# Patient Record
Sex: MALE | Race: OTHER | Employment: UNEMPLOYED | URBAN - METROPOLITAN AREA
[De-identification: names, ages, dates, MRNs, and addresses within clinical notes are randomized per-mention and may not be internally consistent; named-entity substitution may affect disease eponyms.]

---

## 2017-05-16 ENCOUNTER — HOSPITAL ENCOUNTER (OUTPATIENT)
Dept: MRI IMAGING | Age: 62
Discharge: HOME | End: 2017-05-16

## 2017-05-16 ENCOUNTER — HOSPITAL ENCOUNTER (OUTPATIENT)
Dept: GENERAL RADIOLOGY | Age: 62
Discharge: HOME | End: 2017-05-16

## 2017-05-16 DIAGNOSIS — I69.30 SEQUELAE OF CEREBRAL INFARCTION: ICD-10-CM

## 2017-05-16 DIAGNOSIS — Z13.89 ENCOUNTER FOR IMAGING TO SCREEN FOR METAL PRIOR TO MRI: ICD-10-CM

## 2017-07-11 ENCOUNTER — OFFICE VISIT (OUTPATIENT)
Dept: ORTHOPEDIC SURGERY | Age: 62
End: 2017-07-11

## 2017-07-11 VITALS — BODY MASS INDEX: 34.53 KG/M2 | WEIGHT: 220.02 LBS | HEIGHT: 67 IN

## 2017-07-11 DIAGNOSIS — M25.571 ACUTE RIGHT ANKLE PAIN: Primary | ICD-10-CM

## 2017-07-11 PROCEDURE — L4360 PNEUMAT WALKING BOOT PRE CST: HCPCS | Performed by: ORTHOPAEDIC SURGERY

## 2017-07-11 PROCEDURE — 99203 OFFICE O/P NEW LOW 30 MIN: CPT | Performed by: ORTHOPAEDIC SURGERY

## 2017-08-15 ENCOUNTER — OFFICE VISIT (OUTPATIENT)
Dept: ORTHOPEDIC SURGERY | Age: 62
End: 2017-08-15

## 2017-08-15 VITALS
DIASTOLIC BLOOD PRESSURE: 104 MMHG | SYSTOLIC BLOOD PRESSURE: 163 MMHG | BODY MASS INDEX: 34.53 KG/M2 | HEIGHT: 67 IN | WEIGHT: 220.02 LBS | HEART RATE: 58 BPM

## 2017-08-15 DIAGNOSIS — M25.571 ACUTE RIGHT ANKLE PAIN: Primary | ICD-10-CM

## 2017-08-15 DIAGNOSIS — S82.811A CLOSED TORUS FRACTURE OF PROXIMAL END OF RIGHT FIBULA, INITIAL ENCOUNTER: ICD-10-CM

## 2017-08-15 PROCEDURE — 73590 X-RAY EXAM OF LOWER LEG: CPT | Performed by: ORTHOPAEDIC SURGERY

## 2017-08-15 PROCEDURE — L1902 AFO ANKLE GAUNTLET PRE OTS: HCPCS | Performed by: ORTHOPAEDIC SURGERY

## 2017-08-15 PROCEDURE — 27780 TREATMENT OF FIBULA FRACTURE: CPT | Performed by: ORTHOPAEDIC SURGERY

## 2017-08-15 PROCEDURE — 99212 OFFICE O/P EST SF 10 MIN: CPT | Performed by: ORTHOPAEDIC SURGERY

## 2017-11-02 ENCOUNTER — HOSPITAL ENCOUNTER (OUTPATIENT)
Dept: CT IMAGING | Age: 62
Discharge: HOME | End: 2017-11-02
Attending: NURSE PRACTITIONER | Admitting: NURSE PRACTITIONER

## 2018-04-13 PROBLEM — I50.41 ACUTE COMBINED SYSTOLIC AND DIASTOLIC CHF, NYHA CLASS 3 (HCC): Status: ACTIVE | Noted: 2018-04-13

## 2018-04-21 ENCOUNTER — TELEPHONE (OUTPATIENT)
Dept: PULMONOLOGY | Age: 63
End: 2018-04-21

## 2018-04-23 ENCOUNTER — TELEPHONE (OUTPATIENT)
Dept: MEDSURG UNIT | Age: 63
End: 2018-04-23

## 2018-04-24 ENCOUNTER — OFFICE VISIT (OUTPATIENT)
Dept: INTERNAL MEDICINE | Age: 63
End: 2018-04-24

## 2018-04-24 VITALS
HEART RATE: 58 BPM | DIASTOLIC BLOOD PRESSURE: 94 MMHG | SYSTOLIC BLOOD PRESSURE: 175 MMHG | WEIGHT: 208 LBS | BODY MASS INDEX: 33.57 KG/M2 | OXYGEN SATURATION: 96 %

## 2018-04-24 DIAGNOSIS — I63.9 CEREBROVASCULAR ACCIDENT (CVA), UNSPECIFIED MECHANISM (HCC): ICD-10-CM

## 2018-04-24 DIAGNOSIS — J45.40 MODERATE PERSISTENT ASTHMA, UNSPECIFIED WHETHER COMPLICATED: ICD-10-CM

## 2018-04-24 DIAGNOSIS — J81.0 ACUTE PULMONARY EDEMA (HCC): Primary | ICD-10-CM

## 2018-04-24 DIAGNOSIS — B19.20 HEPATITIS C VIRUS INFECTION WITHOUT HEPATIC COMA, UNSPECIFIED CHRONICITY: ICD-10-CM

## 2018-04-24 PROCEDURE — 99999 PR OFFICE/OUTPT VISIT,PROCEDURE ONLY: CPT | Performed by: INTERNAL MEDICINE

## 2018-04-24 RX ORDER — ASPIRIN 81 MG/1
81 TABLET ORAL DAILY
Qty: 30 TABLET | Refills: 3 | Status: SHIPPED | OUTPATIENT
Start: 2018-04-24 | End: 2018-09-04 | Stop reason: SDUPTHER

## 2018-04-24 RX ORDER — AMLODIPINE BESYLATE 10 MG/1
10 TABLET ORAL DAILY
Qty: 30 TABLET | Refills: 3 | Status: SHIPPED | OUTPATIENT
Start: 2018-04-24 | End: 2018-08-30 | Stop reason: SDUPTHER

## 2018-04-24 RX ORDER — SPIRONOLACTONE 25 MG/1
25 TABLET ORAL DAILY
Qty: 30 TABLET | Refills: 3 | Status: SHIPPED | OUTPATIENT
Start: 2018-04-24 | End: 2018-09-04 | Stop reason: SDUPTHER

## 2018-04-24 RX ORDER — VALSARTAN 160 MG/1
160 TABLET ORAL DAILY
Qty: 30 TABLET | Refills: 3 | Status: SHIPPED | OUTPATIENT
Start: 2018-04-24 | End: 2020-01-24

## 2018-04-24 RX ORDER — CLOPIDOGREL BISULFATE 75 MG/1
75 TABLET ORAL DAILY
Qty: 30 TABLET | Refills: 3 | Status: SHIPPED | OUTPATIENT
Start: 2018-04-24 | End: 2018-09-04 | Stop reason: SDUPTHER

## 2018-04-24 RX ORDER — ATORVASTATIN CALCIUM 40 MG/1
40 TABLET, FILM COATED ORAL NIGHTLY
Qty: 30 TABLET | Refills: 3 | Status: SHIPPED | OUTPATIENT
Start: 2018-04-24 | End: 2018-09-04 | Stop reason: SDUPTHER

## 2018-04-24 RX ORDER — BUDESONIDE AND FORMOTEROL FUMARATE DIHYDRATE 80; 4.5 UG/1; UG/1
2 AEROSOL RESPIRATORY (INHALATION) 2 TIMES DAILY
Qty: 1 INHALER | Refills: 3 | Status: SHIPPED | OUTPATIENT
Start: 2018-04-24 | End: 2018-04-30 | Stop reason: CLARIF

## 2018-05-09 ENCOUNTER — TELEPHONE (OUTPATIENT)
Dept: OTHER | Age: 63
End: 2018-05-09

## 2018-05-09 ENCOUNTER — OFFICE VISIT (OUTPATIENT)
Dept: PULMONOLOGY | Age: 63
End: 2018-05-09

## 2018-05-09 ENCOUNTER — HOSPITAL ENCOUNTER (OUTPATIENT)
Dept: OTHER | Age: 63
Discharge: HOME | End: 2018-05-09
Attending: INTERNAL MEDICINE | Admitting: INTERNAL MEDICINE

## 2018-05-09 ENCOUNTER — OFFICE VISIT (OUTPATIENT)
Dept: OTHER | Age: 63
End: 2018-05-09

## 2018-05-09 VITALS
BODY MASS INDEX: 33.59 KG/M2 | OXYGEN SATURATION: 97 % | WEIGHT: 214 LBS | HEART RATE: 63 BPM | SYSTOLIC BLOOD PRESSURE: 130 MMHG | DIASTOLIC BLOOD PRESSURE: 78 MMHG | HEIGHT: 67 IN

## 2018-05-09 VITALS
HEIGHT: 67 IN | RESPIRATION RATE: 16 BRPM | OXYGEN SATURATION: 96 % | TEMPERATURE: 97.6 F | HEART RATE: 62 BPM | DIASTOLIC BLOOD PRESSURE: 79 MMHG | BODY MASS INDEX: 33.12 KG/M2 | SYSTOLIC BLOOD PRESSURE: 133 MMHG | WEIGHT: 211 LBS

## 2018-05-09 DIAGNOSIS — J81.0 ACUTE PULMONARY EDEMA (HCC): ICD-10-CM

## 2018-05-09 DIAGNOSIS — E78.2 MIXED HYPERLIPIDEMIA: ICD-10-CM

## 2018-05-09 DIAGNOSIS — R55 SYNCOPE AND COLLAPSE: ICD-10-CM

## 2018-05-09 DIAGNOSIS — J94.2 HEMOTHORAX ON RIGHT: Primary | ICD-10-CM

## 2018-05-09 DIAGNOSIS — I10 ESSENTIAL HYPERTENSION: Primary | ICD-10-CM

## 2018-05-09 DIAGNOSIS — R06.02 SOB (SHORTNESS OF BREATH): ICD-10-CM

## 2018-05-09 DIAGNOSIS — F10.20 UNCOMPLICATED ALCOHOL DEPENDENCE (HCC): ICD-10-CM

## 2018-05-09 DIAGNOSIS — I10 ESSENTIAL HYPERTENSION: ICD-10-CM

## 2018-05-09 DIAGNOSIS — E66.09 CLASS 1 OBESITY DUE TO EXCESS CALORIES WITHOUT SERIOUS COMORBIDITY WITH BODY MASS INDEX (BMI) OF 33.0 TO 33.9 IN ADULT: ICD-10-CM

## 2018-05-09 DIAGNOSIS — B18.2 CHRONIC HEPATITIS C WITHOUT HEPATIC COMA (HCC): ICD-10-CM

## 2018-05-09 DIAGNOSIS — B19.20 HEPATITIS C VIRUS INFECTION WITHOUT HEPATIC COMA, UNSPECIFIED CHRONICITY: ICD-10-CM

## 2018-05-09 DIAGNOSIS — S22.31XD CLOSED FRACTURE OF ONE RIB OF RIGHT SIDE WITH ROUTINE HEALING, SUBSEQUENT ENCOUNTER: ICD-10-CM

## 2018-05-09 DIAGNOSIS — Z86.73 HISTORY OF STROKE: ICD-10-CM

## 2018-05-09 DIAGNOSIS — D69.6 THROMBOCYTOPENIA (HCC): ICD-10-CM

## 2018-05-09 LAB
A/G RATIO: 1.4 (ref 1.1–2.2)
ALBUMIN SERPL-MCNC: 4.5 G/DL (ref 3.4–5)
ALP BLD-CCNC: 98 U/L (ref 40–129)
ALT SERPL-CCNC: 46 U/L (ref 10–40)
ANION GAP SERPL CALCULATED.3IONS-SCNC: 16 MMOL/L (ref 3–16)
AST SERPL-CCNC: 46 U/L (ref 15–37)
BILIRUB SERPL-MCNC: 0.7 MG/DL (ref 0–1)
BUN BLDV-MCNC: 18 MG/DL (ref 7–20)
CALCIUM SERPL-MCNC: 9.4 MG/DL (ref 8.3–10.6)
CHLORIDE BLD-SCNC: 101 MMOL/L (ref 99–110)
CO2: 21 MMOL/L (ref 21–32)
CREAT SERPL-MCNC: 0.7 MG/DL (ref 0.8–1.3)
GFR AFRICAN AMERICAN: >60
GFR NON-AFRICAN AMERICAN: >60
GLOBULIN: 3.3 G/DL
GLUCOSE BLD-MCNC: 105 MG/DL (ref 70–99)
POTASSIUM SERPL-SCNC: 4.6 MMOL/L (ref 3.5–5.1)
SODIUM BLD-SCNC: 138 MMOL/L (ref 136–145)
TOTAL PROTEIN: 7.8 G/DL (ref 6.4–8.2)

## 2018-05-09 PROCEDURE — G8427 DOCREV CUR MEDS BY ELIG CLIN: HCPCS | Performed by: INTERNAL MEDICINE

## 2018-05-09 PROCEDURE — 1111F DSCHRG MED/CURRENT MED MERGE: CPT | Performed by: INTERNAL MEDICINE

## 2018-05-09 PROCEDURE — 99213 OFFICE O/P EST LOW 20 MIN: CPT | Performed by: INTERNAL MEDICINE

## 2018-05-09 PROCEDURE — G8417 CALC BMI ABV UP PARAM F/U: HCPCS | Performed by: INTERNAL MEDICINE

## 2018-05-09 PROCEDURE — 3017F COLORECTAL CA SCREEN DOC REV: CPT | Performed by: INTERNAL MEDICINE

## 2018-05-09 PROCEDURE — G8598 ASA/ANTIPLAT THER USED: HCPCS | Performed by: INTERNAL MEDICINE

## 2018-05-09 PROCEDURE — 1036F TOBACCO NON-USER: CPT | Performed by: INTERNAL MEDICINE

## 2018-05-09 RX ORDER — ALBUTEROL SULFATE 90 UG/1
2 AEROSOL, METERED RESPIRATORY (INHALATION) EVERY 6 HOURS PRN
COMMUNITY
End: 2019-11-06 | Stop reason: SDUPTHER

## 2018-05-09 ASSESSMENT — ENCOUNTER SYMPTOMS
COUGH: 0
SHORTNESS OF BREATH: 0

## 2018-05-11 ENCOUNTER — TELEPHONE (OUTPATIENT)
Dept: PULMONOLOGY | Age: 63
End: 2018-05-11

## 2018-05-11 DIAGNOSIS — J90 PLEURAL EFFUSION: Primary | ICD-10-CM

## 2018-05-11 ASSESSMENT — ENCOUNTER SYMPTOMS
SHORTNESS OF BREATH: 1
TROUBLE SWALLOWING: 1

## 2018-05-15 LAB
EER HCV RNA QNT PCR: NORMAL
HCV RNA QNT REAL-TIME PCR INTERP: NOT DETECTED
HCV RNA, QUANTITATIVE REAL TIME PCR: <1.2 LOG IU
HEPATITIS C RNA PCR QUANT: <15 IU/ML

## 2018-06-19 ENCOUNTER — HOSPITAL ENCOUNTER (OUTPATIENT)
Dept: OTHER | Age: 63
Discharge: HOME | End: 2018-06-19
Attending: FAMILY MEDICINE | Admitting: FAMILY MEDICINE

## 2018-06-19 DIAGNOSIS — M25.521 ARTHRALGIA OF ELBOW, RIGHT: ICD-10-CM

## 2018-08-01 ENCOUNTER — TELEPHONE (OUTPATIENT)
Dept: OTHER | Age: 63
End: 2018-08-01

## 2018-08-01 RX ORDER — LOSARTAN POTASSIUM 100 MG/1
100 TABLET ORAL DAILY
Qty: 30 TABLET | Refills: 3 | Status: SHIPPED | OUTPATIENT
Start: 2018-08-01 | End: 2018-11-08 | Stop reason: SDUPTHER

## 2018-08-01 NOTE — TELEPHONE ENCOUNTER
Pharmacy called and they are unable to get the Valsartan due to the recall. He was taking 160 mg qd. Please prescribe a new ARB for him. The pharmacy is correct in [facility]

## 2018-08-15 ENCOUNTER — HOSPITAL ENCOUNTER (OUTPATIENT)
Dept: GENERAL RADIOLOGY | Age: 63
Discharge: HOME | End: 2018-08-15
Payer: MEDICAID

## 2018-08-15 ENCOUNTER — HOSPITAL ENCOUNTER (OUTPATIENT)
Age: 63
Discharge: HOME | End: 2018-08-15
Payer: MEDICAID

## 2018-08-15 ENCOUNTER — OFFICE VISIT (OUTPATIENT)
Dept: PULMONOLOGY | Age: 63
End: 2018-08-15

## 2018-08-15 VITALS — OXYGEN SATURATION: 98 % | WEIGHT: 208 LBS | RESPIRATION RATE: 16 BRPM | BODY MASS INDEX: 32.65 KG/M2 | HEIGHT: 67 IN

## 2018-08-15 DIAGNOSIS — E66.09 CLASS 1 OBESITY DUE TO EXCESS CALORIES WITHOUT SERIOUS COMORBIDITY WITH BODY MASS INDEX (BMI) OF 33.0 TO 33.9 IN ADULT: ICD-10-CM

## 2018-08-15 DIAGNOSIS — S22.31XD CLOSED FRACTURE OF ONE RIB OF RIGHT SIDE WITH ROUTINE HEALING, SUBSEQUENT ENCOUNTER: ICD-10-CM

## 2018-08-15 DIAGNOSIS — J94.2 HEMOTHORAX ON RIGHT: ICD-10-CM

## 2018-08-15 DIAGNOSIS — J90 PLEURAL EFFUSION: Primary | ICD-10-CM

## 2018-08-15 DIAGNOSIS — J90 PLEURAL EFFUSION: ICD-10-CM

## 2018-08-15 DIAGNOSIS — R06.02 SOB (SHORTNESS OF BREATH): ICD-10-CM

## 2018-08-15 PROCEDURE — G8417 CALC BMI ABV UP PARAM F/U: HCPCS | Performed by: INTERNAL MEDICINE

## 2018-08-15 PROCEDURE — G8598 ASA/ANTIPLAT THER USED: HCPCS | Performed by: INTERNAL MEDICINE

## 2018-08-15 PROCEDURE — 3017F COLORECTAL CA SCREEN DOC REV: CPT | Performed by: INTERNAL MEDICINE

## 2018-08-15 PROCEDURE — G8427 DOCREV CUR MEDS BY ELIG CLIN: HCPCS | Performed by: INTERNAL MEDICINE

## 2018-08-15 PROCEDURE — 1036F TOBACCO NON-USER: CPT | Performed by: INTERNAL MEDICINE

## 2018-08-15 PROCEDURE — 99213 OFFICE O/P EST LOW 20 MIN: CPT | Performed by: INTERNAL MEDICINE

## 2018-08-15 PROCEDURE — 71046 X-RAY EXAM CHEST 2 VIEWS: CPT

## 2018-08-15 ASSESSMENT — ENCOUNTER SYMPTOMS: SHORTNESS OF BREATH: 1

## 2018-08-15 NOTE — PROGRESS NOTES
protocol,      He has past history of hepatitis C, hemorrhagic stroke without deficit.      He was admitted for CHF, diuresed. Plavix and statin was continued. He has improved but has persistent chest discomfort and persistent pleural effusion.        Past Medical History:   Diagnosis Date    Aneurysm ([facility]     cerebral aneurysm - stated    Asthma     Cerebral artery occlusion with cerebral infarction ([facility] [address] [facility]     Hepatitis C     Hypertension        Past Surgical History:   Procedure Laterality Date    COLONOSCOPY  2016    polyps - per patient       Social History   Substance Use Topics    Smoking status: Never Smoker    Smokeless tobacco: Never Used    Alcohol use 44.4 oz/week     74 Cans of beer per week      Comment: 12 pk a night       Family History   Problem Relation Age of Onset    Other Mother         aneurysm    Diabetes Mother     High Blood Pressure Mother     High Cholesterol Mother     Heart Disease Father     Diabetes Father     High Blood Pressure Father     High Cholesterol Father          Current Outpatient Prescriptions:     losartan (COZAAR) 100 MG tablet, Take 1 tablet by mouth daily, Disp: 30 tablet, Rfl: 3    atorvastatin (LIPITOR) 40 MG tablet, Take 1 tablet by mouth nightly, Disp: 30 tablet, Rfl: 3    spironolactone (ALDACTONE) 25 MG tablet, Take 1 tablet by mouth daily, Disp: 30 tablet, Rfl: 3    aspirin EC 81 MG EC tablet, Take 1 tablet by mouth daily, Disp: 30 tablet, Rfl: 3    amLODIPine (NORVASC) 10 MG tablet, Take 1 tablet by mouth daily, Disp: 30 tablet, Rfl: 3    Multiple Vitamins-Minerals (THERAPEUTIC MULTIVITAMIN-MINERALS) tablet, Take 1 tablet by mouth daily, Disp: 30 tablet, Rfl: 1    albuterol sulfate HFA (PROAIR HFA) 108 (90 Base) MCG/ACT inhaler, Inhale 2 puffs into the lungs every 6 hours as needed for Wheezing, Disp: , Rfl:     Fluticasone Furoate-Vilanterol (BREO ELLIPTA IN), Inhale into the lungs 2 times daily Inhale two puffs twice daily, Disp: , Rfl:

## 2018-08-15 NOTE — LETTER
[address] [place] [address] Pulmonary Critical Care and Sleep  [address] [place] [address] Margaret Goss [address]  Phone: [phone]  Fax: [phone]    James Austin MD        August 18, 2018       Patient: Vince Smart   MR Number: [id number]   YOB: 1955   Date of Visit: 8/15/2018       Dear Dr. Christine Brewster:    Jeff Guthrie was seen in follow-up today. Below are the relevant portions of my assessment and plan of care. If you have questions, please do not hesitate to call me. I look forward to following Genoveva Gardner along with you.     Sincerely,        Rochelle Braxton MD    CC providers:  Marylen Putt, MD  [address] [place] [address]

## 2018-08-21 ENCOUNTER — APPOINTMENT (OUTPATIENT)
Dept: CT IMAGING | Age: 63
DRG: 045 | End: 2018-08-21
Payer: MEDICAID

## 2018-08-21 ENCOUNTER — APPOINTMENT (OUTPATIENT)
Dept: GENERAL RADIOLOGY | Age: 63
DRG: 045 | End: 2018-08-21
Payer: MEDICAID

## 2018-08-21 ENCOUNTER — HOSPITAL ENCOUNTER (INPATIENT)
Age: 63
LOS: 2 days | Discharge: HOME HEALTH | DRG: 045 | End: 2018-08-23
Attending: EMERGENCY MEDICINE | Admitting: INTERNAL MEDICINE
Payer: MEDICAID

## 2018-08-21 DIAGNOSIS — R20.2 NUMBNESS AND TINGLING: Primary | ICD-10-CM

## 2018-08-21 DIAGNOSIS — R20.0 NUMBNESS AND TINGLING: Primary | ICD-10-CM

## 2018-08-21 PROBLEM — J45.909 ASTHMA: Status: ACTIVE | Noted: 2018-08-21

## 2018-08-21 LAB
A/G RATIO: 1.5 (ref 1.1–2.2)
ALBUMIN SERPL-MCNC: 4.7 G/DL (ref 3.4–5)
ALP BLD-CCNC: 92 U/L (ref 40–129)
ALT SERPL-CCNC: 42 U/L (ref 10–40)
ANION GAP SERPL CALCULATED.3IONS-SCNC: 15 MMOL/L (ref 3–16)
AST SERPL-CCNC: 29 U/L (ref 15–37)
BASOPHILS ABSOLUTE: 0.1 K/UL (ref 0–0.2)
BASOPHILS RELATIVE PERCENT: 1.1 %
BILIRUB SERPL-MCNC: 0.9 MG/DL (ref 0–1)
BUN BLDV-MCNC: 19 MG/DL (ref 7–20)
CALCIUM SERPL-MCNC: 9.4 MG/DL (ref 8.3–10.6)
CHLORIDE BLD-SCNC: 101 MMOL/L (ref 99–110)
CO2: 22 MMOL/L (ref 21–32)
CREAT SERPL-MCNC: 1.4 MG/DL (ref 0.8–1.3)
EOSINOPHILS ABSOLUTE: 0.1 K/UL (ref 0–0.6)
EOSINOPHILS RELATIVE PERCENT: 1 %
GFR AFRICAN AMERICAN: >60
GFR NON-AFRICAN AMERICAN: 51
GLOBULIN: 3.2 G/DL
GLUCOSE BLD-MCNC: 103 MG/DL (ref 70–99)
GLUCOSE BLD-MCNC: 112 MG/DL (ref 70–99)
HCT VFR BLD CALC: 48 % (ref 40.5–52.5)
HEMOGLOBIN: 16.7 G/DL (ref 13.5–17.5)
LYMPHOCYTES ABSOLUTE: 0.6 K/UL (ref 1–5.1)
LYMPHOCYTES RELATIVE PERCENT: 9.1 %
MCH RBC QN AUTO: 30.3 PG (ref 26–34)
MCHC RBC AUTO-ENTMCNC: 34.8 G/DL (ref 31–36)
MCV RBC AUTO: 86.9 FL (ref 80–100)
MONOCYTES ABSOLUTE: 0.8 K/UL (ref 0–1.3)
MONOCYTES RELATIVE PERCENT: 11.8 %
NEUTROPHILS ABSOLUTE: 5.1 K/UL (ref 1.7–7.7)
NEUTROPHILS RELATIVE PERCENT: 77 %
PDW BLD-RTO: 14.5 % (ref 12.4–15.4)
PERFORMED ON: ABNORMAL
PLATELET # BLD: 169 K/UL (ref 135–450)
PMV BLD AUTO: 9 FL (ref 5–10.5)
POTASSIUM SERPL-SCNC: 4.2 MMOL/L (ref 3.5–5.1)
RBC # BLD: 5.52 M/UL (ref 4.2–5.9)
SODIUM BLD-SCNC: 138 MMOL/L (ref 136–145)
TOTAL PROTEIN: 7.9 G/DL (ref 6.4–8.2)
TROPONIN: <0.01 NG/ML
WBC # BLD: 6.6 K/UL (ref 4–11)

## 2018-08-21 PROCEDURE — 1200000000 HC SEMI PRIVATE

## 2018-08-21 PROCEDURE — 6370000000 HC RX 637 (ALT 250 FOR IP): Performed by: INTERNAL MEDICINE

## 2018-08-21 PROCEDURE — 80053 COMPREHEN METABOLIC PANEL: CPT

## 2018-08-21 PROCEDURE — 2580000003 HC RX 258: Performed by: INTERNAL MEDICINE

## 2018-08-21 PROCEDURE — 96360 HYDRATION IV INFUSION INIT: CPT

## 2018-08-21 PROCEDURE — 84484 ASSAY OF TROPONIN QUANT: CPT

## 2018-08-21 PROCEDURE — 2580000003 HC RX 258: Performed by: EMERGENCY MEDICINE

## 2018-08-21 PROCEDURE — 36415 COLL VENOUS BLD VENIPUNCTURE: CPT

## 2018-08-21 PROCEDURE — 85025 COMPLETE CBC W/AUTO DIFF WBC: CPT

## 2018-08-21 PROCEDURE — 70450 CT HEAD/BRAIN W/O DYE: CPT

## 2018-08-21 PROCEDURE — 93005 ELECTROCARDIOGRAM TRACING: CPT | Performed by: EMERGENCY MEDICINE

## 2018-08-21 PROCEDURE — 99285 EMERGENCY DEPT VISIT HI MDM: CPT

## 2018-08-21 PROCEDURE — 71046 X-RAY EXAM CHEST 2 VIEWS: CPT

## 2018-08-21 RX ORDER — ASPIRIN 81 MG/1
81 TABLET ORAL DAILY
Status: DISCONTINUED | OUTPATIENT
Start: 2018-08-21 | End: 2018-08-23 | Stop reason: HOSPADM

## 2018-08-21 RX ORDER — ATORVASTATIN CALCIUM 40 MG/1
40 TABLET, FILM COATED ORAL NIGHTLY
Status: DISCONTINUED | OUTPATIENT
Start: 2018-08-21 | End: 2018-08-23 | Stop reason: HOSPADM

## 2018-08-21 RX ORDER — AMLODIPINE BESYLATE 5 MG/1
10 TABLET ORAL DAILY
Status: DISCONTINUED | OUTPATIENT
Start: 2018-08-21 | End: 2018-08-23 | Stop reason: HOSPADM

## 2018-08-21 RX ORDER — FLUTICASONE FUROATE AND VILANTEROL 200; 25 UG/1; UG/1
1 POWDER RESPIRATORY (INHALATION) 2 TIMES DAILY
Status: DISCONTINUED | OUTPATIENT
Start: 2018-08-21 | End: 2018-08-23 | Stop reason: HOSPADM

## 2018-08-21 RX ORDER — SPIRONOLACTONE 25 MG/1
25 TABLET ORAL DAILY
Status: DISCONTINUED | OUTPATIENT
Start: 2018-08-21 | End: 2018-08-21

## 2018-08-21 RX ORDER — ONDANSETRON 2 MG/ML
4 INJECTION INTRAMUSCULAR; INTRAVENOUS EVERY 6 HOURS PRN
Status: DISCONTINUED | OUTPATIENT
Start: 2018-08-21 | End: 2018-08-23 | Stop reason: HOSPADM

## 2018-08-21 RX ORDER — THIAMINE MONONITRATE (VIT B1) 100 MG
100 TABLET ORAL DAILY
Status: DISCONTINUED | OUTPATIENT
Start: 2018-08-21 | End: 2018-08-23 | Stop reason: HOSPADM

## 2018-08-21 RX ORDER — TRAZODONE HYDROCHLORIDE 50 MG/1
50 TABLET ORAL NIGHTLY
Status: DISCONTINUED | OUTPATIENT
Start: 2018-08-21 | End: 2018-08-23 | Stop reason: HOSPADM

## 2018-08-21 RX ORDER — CLOPIDOGREL BISULFATE 75 MG/1
75 TABLET ORAL DAILY
Status: DISCONTINUED | OUTPATIENT
Start: 2018-08-21 | End: 2018-08-23 | Stop reason: HOSPADM

## 2018-08-21 RX ORDER — SODIUM CHLORIDE 0.9 % (FLUSH) 0.9 %
10 SYRINGE (ML) INJECTION EVERY 12 HOURS SCHEDULED
Status: DISCONTINUED | OUTPATIENT
Start: 2018-08-21 | End: 2018-08-23 | Stop reason: HOSPADM

## 2018-08-21 RX ORDER — SODIUM CHLORIDE 0.9 % (FLUSH) 0.9 %
10 SYRINGE (ML) INJECTION PRN
Status: DISCONTINUED | OUTPATIENT
Start: 2018-08-21 | End: 2018-08-23 | Stop reason: HOSPADM

## 2018-08-21 RX ORDER — LOSARTAN POTASSIUM 100 MG/1
100 TABLET ORAL DAILY
Status: DISCONTINUED | OUTPATIENT
Start: 2018-08-21 | End: 2018-08-23 | Stop reason: HOSPADM

## 2018-08-21 RX ORDER — ALBUTEROL SULFATE 90 UG/1
2 AEROSOL, METERED RESPIRATORY (INHALATION) EVERY 6 HOURS PRN
Status: DISCONTINUED | OUTPATIENT
Start: 2018-08-21 | End: 2018-08-23 | Stop reason: HOSPADM

## 2018-08-21 RX ORDER — TOPIRAMATE 25 MG/1
50 TABLET ORAL 2 TIMES DAILY
Status: DISCONTINUED | OUTPATIENT
Start: 2018-08-21 | End: 2018-08-21

## 2018-08-21 RX ORDER — 0.9 % SODIUM CHLORIDE 0.9 %
1000 INTRAVENOUS SOLUTION INTRAVENOUS ONCE
Status: COMPLETED | OUTPATIENT
Start: 2018-08-21 | End: 2018-08-21

## 2018-08-21 RX ADMIN — CLOPIDOGREL BISULFATE 75 MG: 75 TABLET ORAL at 21:23

## 2018-08-21 RX ADMIN — SODIUM CHLORIDE 1000 ML: 9 INJECTION, SOLUTION INTRAVENOUS at 15:03

## 2018-08-21 RX ADMIN — TRAZODONE HYDROCHLORIDE 50 MG: 50 TABLET ORAL at 21:23

## 2018-08-21 RX ADMIN — Medication 10 ML: at 21:24

## 2018-08-21 NOTE — PROGRESS NOTES
Patient admitted to room 210-2 from ED. Patient oriented to room, call light, bed rails, phone, lights and bathroom. Patient instructed about the schedule of the day including: vital sign frequency, lab draws, possible tests, frequency of MD and staff rounds, including RN/MD rounding together at bedside, daily weights, and I &O's. Patient instructed about prescribed diet, how to use 8MENU, and television. Bed alarm in place, patient aware of placement and reason. Telemetry box 55 in place, patient aware of placement and reason. Bed locked, in lowest position, side rails up 2/4, call light within reach. Will continue to monitor.

## 2018-08-21 NOTE — H&P
Hospital Medicine History & Physical      PCP: Marta Shelby MD    Date of Admission: 8/21/2018    Date of Service: Pt seen/examined on 8/21/2018 and Admitted to Inpatient with expected LOS greater than two midnights due to medical therapy. Chief Complaint:  Numbness and tingling in the right side including face. History Of Present Illness:      61 y.o. male who presented to [facility] with above complaint. He denies change in mental status, slurred speech, swallowing difficulty, palpitation, dizziness, syncope, chest pain, shortness of breath, abdominal pain or lower extremity edema. Past Medical History:          Diagnosis Date    Aneurysm ([facility] [address]     cerebral aneurysm - stated    Asthma     Cerebral artery occlusion with cerebral infarction ([facility] [address] [facility]     Hepatitis C     Hypertension        Past Surgical History:          Procedure Laterality Date    COLONOSCOPY  2016    polyps - per patient       Medications Prior to Admission:      Prior to Admission medications    Medication Sig Start Date End Date Taking?  Authorizing Provider   losartan (COZAAR) 100 MG tablet Take 1 tablet by mouth daily 8/1/18  Yes Nova Livingston MD   albuterol sulfate HFA (PROAIR HFA) 108 (90 Base) MCG/ACT inhaler Inhale 2 puffs into the lungs every 6 hours as needed for Wheezing   Yes Historical Provider, MD   Fluticasone Furoate-Vilanterol (BREO ELLIPTA IN) Inhale into the lungs 2 times daily Inhale two puffs twice daily   Yes Historical Provider, MD   atorvastatin (LIPITOR) 40 MG tablet Take 1 tablet by mouth nightly 4/24/18  Yes Nova Livingston MD   valsartan (DIOVAN) 160 MG tablet Take 1 tablet by mouth daily 4/24/18  Yes Nova Livingston MD   spironolactone (ALDACTONE) 25 MG tablet Take 1 tablet by mouth daily 4/24/18  Yes Nova Livingston MD   aspirin EC 81 MG EC tablet Take 1 tablet by mouth daily 4/24/18  Yes Nova Livingston MD   clopidogrel (PLAVIX) 75 MG tablet Take 1 tablet by pleural effusion         CT HEAD WO CONTRAST   Final Result   No acute intracranial abnormality. Critical results were called by Dr. Beth Aguillon MD to Martha Bledsoe   on 8/21/2018 at 12:37. ASSESSMENT:    Active Hospital Problems    Diagnosis Date Noted    Cerebrovascular accident (CVA) determined by clinical assessment ([facility] [I63.9] 08/21/2018    Asthma [J45.909] 08/21/2018    HLD (hyperlipidemia) [E78.5] 08/20/2016    HTN (hypertension) [I10] 08/20/2016    Chronic hepatitis C virus infection ([facility] [B18.2] 08/20/2016    Remote history of stroke [Z86.73]          PLAN:  Right-sided numbness and tingling-rule out CVA-admit, telemetry, aspirin,Plavix, statin, MRI of the head, echocardiogram, carotid Doppler and neurology evaluation monitor neuro checks, physician discussed the stroke team and patient is not a candidate for TPA because of hemorrhagic stroke in the past as well as  not clear about the time of the commencement  of symptoms. Bronchial asthma-stable continue home inhalers    Hyperlipidemia-statin    Hypertension-continue with the Norvasc, losartan    History of alcohol abuse-watch for withdrawals-    History of hemorrhagic stroke in the past with mild residual right-sided weakness    Chronic diastolic CHF-stable      DVT Prophylaxis: SCD  Diet:  2 g sodium  Code Status: full code    PT/OT Eval Status: ordered    Dispo - 2-3 days       Fadia Knox MD    Thank you Aisha Lees MD for the opportunity to be involved in this patient's care. If you have any questions or concerns please feel free to contact me at [phone].

## 2018-08-21 NOTE — ED NOTES
Bedside report given to Santy Child RN. Full NIHSS completed with Meghan at bedside.       Laurel Mabry RN  08/21/18 [id number]

## 2018-08-21 NOTE — ED PROVIDER NOTES
Comment: daily    Sexual activity: Yes     Other Topics Concern    None     Social History Narrative    None       Medications/Allergies     Previous Medications    ALBUTEROL SULFATE HFA (PROAIR HFA) 108 (90 BASE) MCG/ACT INHALER    Inhale 2 puffs into the lungs every 6 hours as needed for Wheezing    AMLODIPINE (NORVASC) 10 MG TABLET    Take 1 tablet by mouth daily    ASPIRIN EC 81 MG EC TABLET    Take 1 tablet by mouth daily    ATORVASTATIN (LIPITOR) 40 MG TABLET    Take 1 tablet by mouth nightly    CLOPIDOGREL (PLAVIX) 75 MG TABLET    Take 1 tablet by mouth daily    FLUTICASONE FUROATE-VILANTEROL (BREO ELLIPTA IN)    Inhale into the lungs 2 times daily Inhale two puffs twice daily    LOSARTAN (COZAAR) 100 MG TABLET    Take 1 tablet by mouth daily    MULTIPLE VITAMINS-MINERALS (THERAPEUTIC MULTIVITAMIN-MINERALS) TABLET    Take 1 tablet by mouth daily    OXYCODONE-ACETAMINOPHEN (PERCOCET) 5-325 MG PER TABLET    Take 1 tablet by mouth every 8 hours as needed for Pain .     PROCHLORPERAZINE (COMPAZINE) 10 MG TABLET    Take 1 tablet by mouth every 8 hours as needed (headache and nausea)    SPIRONOLACTONE (ALDACTONE) 25 MG TABLET    Take 1 tablet by mouth daily    THIAMINE 100 MG TABLET    Take 1 tablet by mouth daily    TOPIRAMATE (TOPAMAX) 25 MG TABLET    Take 2 tablets by mouth 2 times daily 2 po bid    TRAZODONE (DESYREL) 50 MG TABLET    Take 50 mg by mouth nightly    VALSARTAN (DIOVAN) 160 MG TABLET    Take 1 tablet by mouth daily     Allergies   Allergen Reactions    Gadolinium Derivatives Anaphylaxis    Iodinated Diagnostic Agents Hives, Shortness Of Breath, Itching and Swelling    Ace Inhibitors      Other reaction(s): cough    Other         Physical Exam       ED Triage Vitals   BP Temp Temp src Pulse Resp SpO2 Height Weight   08/21/18 1211 08/21/18 1216 -- 08/21/18 1216 08/21/18 1211 08/21/18 1216 08/21/18 1216 08/21/18 1216   (!) 159/90 98.6 °F (37 °C)  64 18 95 % 5' 7\" (1.702 m) 204 lb 2 oz (92.6 kg) Physical Exam   Constitutional: He is oriented to person, place, and time. He appears well-developed and well-nourished. No distress. Patient is sitting up in bed, answering questions normally and appropriately. HENT:   Head: Normocephalic and atraumatic. Mouth/Throat: Oropharynx is clear and moist.   Eyes: Pupils are equal, round, and reactive to light. EOM are normal. Right eye exhibits no discharge. Left eye exhibits no discharge. Neck: Normal range of motion. Neck supple. No tracheal deviation present. Cardiovascular: Normal rate, regular rhythm, normal heart sounds and intact distal pulses. Exam reveals no gallop and no friction rub. No murmur heard. Pulmonary/Chest: Effort normal and breath sounds normal. No stridor. No respiratory distress. He has no wheezes. He has no rales. He exhibits no tenderness. Abdominal: Soft. He exhibits no distension. There is no tenderness. There is no rebound and no guarding. Musculoskeletal: Normal range of motion. He exhibits no edema, tenderness or deformity. Neurological: He is alert and oriented to person, place, and time. He is not disoriented. A sensory deficit is present. No cranial nerve deficit. He exhibits normal muscle tone. He displays a negative Romberg sign. Coordination normal.   Skin: Skin is warm and dry. No rash noted. He is not diaphoretic. No erythema. No pallor. Psychiatric: He has a normal mood and affect. Nursing note and vitals reviewed.       Diagnostics   Labs:  Results for orders placed or performed during the hospital encounter of 08/21/18   Comprehensive Metabolic Panel   Result Value Ref Range    Sodium 138 136 - 145 mmol/L    Potassium 4.2 3.5 - 5.1 mmol/L    Chloride 101 99 - 110 mmol/L    CO2 22 21 - 32 mmol/L    Anion Gap 15 3 - 16    Glucose 112 (H) 70 - 99 mg/dL    BUN 19 7 - 20 mg/dL    CREATININE 1.4 (H) 0.8 - 1.3 mg/dL    GFR Non- 51 (A) >60    GFR African American >60 >60    Calcium 9.4 8.3 - 10.6 mg/dL    Total Protein 7.9 6.4 - 8.2 g/dL    Alb 4.7 3.4 - 5.0 g/dL    Albumin/Globulin Ratio 1.5 1.1 - 2.2    Total Bilirubin 0.9 0.0 - 1.0 mg/dL    Alkaline Phosphatase 92 40 - 129 U/L    ALT 42 (H) 10 - 40 U/L    AST 29 15 - 37 U/L    Globulin 3.2 g/dL   CBC Auto Differential   Result Value Ref Range    WBC 6.6 4.0 - 11.0 K/uL    RBC 5.52 4.20 - 5.90 M/uL    Hemoglobin 16.7 13.5 - 17.5 g/dL    Hematocrit 48.0 40.5 - 52.5 %    MCV 86.9 80.0 - 100.0 fL    MCH 30.3 26.0 - 34.0 pg    MCHC 34.8 31.0 - 36.0 g/dL    RDW 14.5 12.4 - 15.4 %    Platelets 692 510 - 924 K/uL    MPV 9.0 5.0 - 10.5 fL    Neutrophils % 77.0 %    Lymphocytes % 9.1 %    Monocytes % 11.8 %    Eosinophils % 1.0 %    Basophils % 1.1 %    Neutrophils # 5.1 1.7 - 7.7 K/uL    Lymphocytes # 0.6 (L) 1.0 - 5.1 K/uL    Monocytes # 0.8 0.0 - 1.3 K/uL    Eosinophils # 0.1 0.0 - 0.6 K/uL    Basophils # 0.1 0.0 - 0.2 K/uL   Troponin   Result Value Ref Range    Troponin <0.01 <0.01 ng/mL   POCT Glucose   Result Value Ref Range    POC Glucose 103 (H) 70 - 99 mg/dl    Performed on ACCU-CHEK    EKG 12 Lead   Result Value Ref Range    Ventricular Rate 66 BPM    Atrial Rate 66 BPM    P-R Interval 136 ms    QRS Duration 162 ms    Q-T Interval 442 ms    QTc Calculation (Bazett) 463 ms    P Axis 37 degrees    R Axis 104 degrees    T Axis 37 degrees    Diagnosis       Normal sinus rhythm with sinus arrhythmiaRight bundle branch blockAbnormal ECGWhen compared with ECG of 14-APR-2018 07:46,Aberrant conduction is no longer Present     Radiographs:  Xr Chest Standard (2 Vw)    Result Date: 8/21/2018  EXAMINATION: TWO VIEWS OF THE CHEST 8/21/2018 12:51 pm COMPARISON: 08/15/2018 HISTORY: ORDERING SYSTEM PROVIDED HISTORY: code stroke TECHNOLOGIST PROVIDED HISTORY: Reason for exam:->code stroke Ordering Physician Provided Reason for Exam: numbness, loss of vision Acuity: Acute Type of Exam: Initial FINDINGS: Heart size and pulmonary vessels stable.   Persistent slight blunting of the right costophrenic angle. Chronic elevation of the left diaphragm. No new abnormality     Stable chest with persistent trace right pleural effusion     Ct Head Wo Contrast    Result Date: 8/21/2018  EXAMINATION: CT OF THE HEAD WITHOUT CONTRAST  8/21/2018 12:28 pm TECHNIQUE: CT of the head was performed without the administration of intravenous contrast. Dose modulation, iterative reconstruction, and/or weight based adjustment of the mA/kV was utilized to reduce the radiation dose to as low as reasonably achievable. COMPARISON: None. HISTORY: ORDERING SYSTEM PROVIDED HISTORY: stroke symptoms right side TECHNOLOGIST PROVIDED HISTORY: Has a \"code stroke\" or \"stroke alert\" been called? ->Yes Ordering Physician Provided Reason for Exam: right sided cramping x 1 hr with right facial numbness Acuity: Acute Type of Exam: Initial Relevant Medical/Surgical History: hx-prev cva with right sided deficits FINDINGS: BRAIN/VENTRICLES: There is no acute intracranial hemorrhage, mass effect or midline shift. No abnormal extra-axial fluid collection. The gray-white differentiation is maintained without evidence of an acute infarct. There is no evidence of hydrocephalus. There is a mild degree of low-attenuation in the periventricular white matter, nonspecific, consistent with small vessel disease. ORBITS: The visualized portion of the orbits demonstrate no acute abnormality. SINUSES: The visualized paranasal sinuses and mastoid air cells demonstrate no acute abnormality. SOFT TISSUES/SKULL:  No acute abnormality of the visualized skull or soft tissues. No acute intracranial abnormality. Critical results were called by Dr. Mary Jo Davila MD to Dolores Neil on 8/21/2018 at 12:37. Procedures/EKG:   Procedures    ED Course and MDM   NIH Stroke Scale  Level of Consciousness (1a. ): Alert  LOC Questions (1b. ):  Answers both correctly  LOC Commands (1c. ): Obeys both correctly  Motor Arm, Left (5a. ): No drift  Motor Arm, Right (5b. ): No drift  Motor Leg, Left (6a. ): No drift  Motor Leg, Right (6b. ): No drift  Best Language (9. ): No aphasia  Dysarthria (10. ): Normal       In brief, Manuel Matamoros is a 61 y.o. male who presented to the emergency department For chief complaint of right-sided numbness. Patient is a history of stroke in the past, and was concern for acute onset of stroke. I did consult the stroke team given his complaints, however in conjunction with them we both agreed that he was not a TPA candidate given the fact that he had hemorrhagic stroke in the past.  Also his symptoms were mild and not significantly debilitating thus the risk was not worth the benefit. CT head was negative for any significant acute process, the rest of his workup is unremarkable outside of some slightly elevated creatinine/kidney function issues. Patient be given some IV fluids, and the plan will be to admit him for numbness and tingling. Unfortunately he was unable to get a CTA of his head and neck because he has anaphylaxis to IV dye which happened on his previous admission. I will discuss case the hospitalist.  Patient agrees with the plan at this time. ED Medication Orders     None          Final Impression      1.  Numbness and tingling      DISPOSITION    (Please note that portions of this note may have been completed with a voice recognition program. Efforts were made to edit the dictations but occasionally words are mis-transcribed.)    Oxana Champion, DO JEREZ 0818 [facility], DO  08/21/18 [id number]

## 2018-08-22 ENCOUNTER — APPOINTMENT (OUTPATIENT)
Dept: MRI IMAGING | Age: 63
DRG: 045 | End: 2018-08-22
Payer: MEDICAID

## 2018-08-22 LAB
ANION GAP SERPL CALCULATED.3IONS-SCNC: 11 MMOL/L (ref 3–16)
BASOPHILS ABSOLUTE: 0.1 K/UL (ref 0–0.2)
BASOPHILS RELATIVE PERCENT: 1.1 %
BUN BLDV-MCNC: 18 MG/DL (ref 7–20)
CALCIUM SERPL-MCNC: 9 MG/DL (ref 8.3–10.6)
CHLORIDE BLD-SCNC: 106 MMOL/L (ref 99–110)
CHOLESTEROL, TOTAL: 124 MG/DL (ref 0–199)
CO2: 24 MMOL/L (ref 21–32)
CREAT SERPL-MCNC: 1.3 MG/DL (ref 0.8–1.3)
EOSINOPHILS ABSOLUTE: 0.1 K/UL (ref 0–0.6)
EOSINOPHILS RELATIVE PERCENT: 2.9 %
GFR AFRICAN AMERICAN: >60
GFR NON-AFRICAN AMERICAN: 56
GLUCOSE BLD-MCNC: 107 MG/DL (ref 70–99)
HCT VFR BLD CALC: 45.3 % (ref 40.5–52.5)
HDLC SERPL-MCNC: 50 MG/DL (ref 40–60)
HEMOGLOBIN: 15.5 G/DL (ref 13.5–17.5)
LDL CHOLESTEROL CALCULATED: 48 MG/DL
LYMPHOCYTES ABSOLUTE: 0.7 K/UL (ref 1–5.1)
LYMPHOCYTES RELATIVE PERCENT: 14.1 %
MCH RBC QN AUTO: 29.9 PG (ref 26–34)
MCHC RBC AUTO-ENTMCNC: 34.1 G/DL (ref 31–36)
MCV RBC AUTO: 87.7 FL (ref 80–100)
MONOCYTES ABSOLUTE: 0.6 K/UL (ref 0–1.3)
MONOCYTES RELATIVE PERCENT: 11.4 %
NEUTROPHILS ABSOLUTE: 3.4 K/UL (ref 1.7–7.7)
NEUTROPHILS RELATIVE PERCENT: 70.5 %
PDW BLD-RTO: 14.3 % (ref 12.4–15.4)
PLATELET # BLD: 154 K/UL (ref 135–450)
PMV BLD AUTO: 8.9 FL (ref 5–10.5)
POTASSIUM REFLEX MAGNESIUM: 4.3 MMOL/L (ref 3.5–5.1)
RBC # BLD: 5.17 M/UL (ref 4.2–5.9)
SODIUM BLD-SCNC: 141 MMOL/L (ref 136–145)
TRIGL SERPL-MCNC: 131 MG/DL (ref 0–150)
VLDLC SERPL CALC-MCNC: 26 MG/DL
WBC # BLD: 4.9 K/UL (ref 4–11)

## 2018-08-22 PROCEDURE — G8978 MOBILITY CURRENT STATUS: HCPCS

## 2018-08-22 PROCEDURE — 36415 COLL VENOUS BLD VENIPUNCTURE: CPT

## 2018-08-22 PROCEDURE — 99255 IP/OBS CONSLTJ NEW/EST HI 80: CPT | Performed by: PSYCHIATRY & NEUROLOGY

## 2018-08-22 PROCEDURE — 97162 PT EVAL MOD COMPLEX 30 MIN: CPT

## 2018-08-22 PROCEDURE — 97116 GAIT TRAINING THERAPY: CPT

## 2018-08-22 PROCEDURE — 80048 BASIC METABOLIC PNL TOTAL CA: CPT

## 2018-08-22 PROCEDURE — 85025 COMPLETE CBC W/AUTO DIFF WBC: CPT

## 2018-08-22 PROCEDURE — 97535 SELF CARE MNGMENT TRAINING: CPT

## 2018-08-22 PROCEDURE — 80061 LIPID PANEL: CPT

## 2018-08-22 PROCEDURE — 93010 ELECTROCARDIOGRAM REPORT: CPT | Performed by: INTERNAL MEDICINE

## 2018-08-22 PROCEDURE — 6370000000 HC RX 637 (ALT 250 FOR IP): Performed by: PSYCHIATRY & NEUROLOGY

## 2018-08-22 PROCEDURE — 2580000003 HC RX 258: Performed by: INTERNAL MEDICINE

## 2018-08-22 PROCEDURE — G8987 SELF CARE CURRENT STATUS: HCPCS

## 2018-08-22 PROCEDURE — 93880 EXTRACRANIAL BILAT STUDY: CPT

## 2018-08-22 PROCEDURE — G8988 SELF CARE GOAL STATUS: HCPCS

## 2018-08-22 PROCEDURE — G8979 MOBILITY GOAL STATUS: HCPCS

## 2018-08-22 PROCEDURE — 1200000000 HC SEMI PRIVATE

## 2018-08-22 PROCEDURE — 70551 MRI BRAIN STEM W/O DYE: CPT

## 2018-08-22 PROCEDURE — 97166 OT EVAL MOD COMPLEX 45 MIN: CPT

## 2018-08-22 PROCEDURE — 6370000000 HC RX 637 (ALT 250 FOR IP): Performed by: INTERNAL MEDICINE

## 2018-08-22 RX ORDER — LEVETIRACETAM 500 MG/1
500 TABLET ORAL 2 TIMES DAILY
Status: DISCONTINUED | OUTPATIENT
Start: 2018-08-22 | End: 2018-08-23 | Stop reason: HOSPADM

## 2018-08-22 RX ADMIN — ATORVASTATIN CALCIUM 40 MG: 80 TABLET, FILM COATED ORAL at 20:35

## 2018-08-22 RX ADMIN — ASPIRIN 81 MG: 81 TABLET, COATED ORAL at 08:24

## 2018-08-22 RX ADMIN — CLOPIDOGREL BISULFATE 75 MG: 75 TABLET ORAL at 08:23

## 2018-08-22 RX ADMIN — LOSARTAN POTASSIUM 100 MG: 100 TABLET, FILM COATED ORAL at 08:23

## 2018-08-22 RX ADMIN — TRAZODONE HYDROCHLORIDE 50 MG: 50 TABLET ORAL at 20:35

## 2018-08-22 RX ADMIN — Medication 10 ML: at 20:35

## 2018-08-22 RX ADMIN — AMLODIPINE BESYLATE 10 MG: 5 TABLET ORAL at 08:23

## 2018-08-22 RX ADMIN — LEVETIRACETAM 500 MG: 500 TABLET ORAL at 20:35

## 2018-08-22 RX ADMIN — Medication 10 ML: at 08:26

## 2018-08-22 RX ADMIN — Medication 100 MG: at 08:23

## 2018-08-22 NOTE — CONSULTS
Med rec was performed using sure scripts and chart review, since it was too late to contact the patient's pharmacy. Everything looks correct and filled recently except trazodone which was last filled 7/10/17 according to sure scripts. Pharmacy will follow up with patient in the morning.    Tarun Ang, PharmD 8/21/2018 10:50 PM

## 2018-08-22 NOTE — PROGRESS NOTES
Nutrition Assessment    Type and Reason for Visit: Initial, Positive Nutrition Screen    Nutrition Recommendations:   · Continue 2 gm Na diet - monitor po intakes and need to liberalize  · Add Ensure with lunch daily  · Encourage po intakes  · Monitor po intakes, nutrition adequacy, weights, pertinent labs, BMs    Malnutrition Assessment:  · Malnutrition Status: At risk for malnutrition  · Context: Acute illness or injury  · Findings of the 6 clinical characteristics of malnutrition (Minimum of 2 out of 6 clinical characteristics is required to make the diagnosis of moderate or severe Protein Calorie Malnutrition based on AND/ASPEN Guidelines):  1. Energy Intake-Less than or equal to 75%, greater than 7 days    2. Weight Loss-No significant weight loss,    3. Fat Loss-No significant subcutaneous fat loss,    4. Muscle Loss-No significant muscle mass loss,    5. Fluid Accumulation-No significant fluid accumulation,    6.  Strength-Not measured    Nutrition Diagnosis:   · Problem: Inadequate oral intake  · Etiology: related to Early satiety, Insufficient energy/nutrient consumption     Signs and symptoms:  as evidenced by Patient report of, Diet history of poor intake    Nutrition Assessment:  · Subjective Assessment: + screen for MST score of 3. Pt is a 62 yo male with Hx of HTN, who was admitted with c/o numbness and tingling in the right side including face. Currently on a 2 gm Na diet. Pt endorses decreased appetite and weight loss of ~20 lb over the past few weeks. No evidence of weight loss per EMR. Pt states that he normally eats ~1 meal per day and endorses early satiety and loss of appetite. Pt reports that he ate his breakfast this am. Pt favorable to adding Ensure, will order. Encouraged smaller, more frequent meals if appeitte remains poor. Pt voiced understanding.    · Nutrition-Focused Physical Findings: See malnutrition assessment  · Wound Type: None  · Current Nutrition Therapies:  · Oral Diet Orders: 2gm Sodium   · Oral Diet intake: Unable to assess  · Oral Nutrition Supplement (ONS) Orders: None  · ONS intake: Unable to assess  · Anthropometric Measures:  · Ht: 5' 7\" (170.2 cm)   · Current Body Wt: 204 lb 2 oz (92.6 kg)  · % Weight Change: None per EMR,     · Ideal Body Wt: 148 lb (67.1 kg)   · BMI Classification: BMI 30.0 - 34.9 Obese Class I  · Comparative Standards (Estimated Nutrition Needs):  · Estimated Daily Total Kcal: 5410-3498  · Estimated Daily Protein (g): 67-81    Estimated Intake vs Estimated Needs: Intake Less Than Needs    Nutrition Risk Level: Moderate    Nutrition Interventions:   Continue current diet, Start ONS  Continued Inpatient Monitoring    Nutrition Evaluation:   · Evaluation: Goals set   · Goals: Pt will have intakes 50% or greater this admission    · Monitoring: Meal Intake, Supplement Intake, Weight    See Adult Nutrition Doc Flowsheet for more detail.      Electronically signed by Day Jacob RD, LD on 8/22/18 at 11:09 AM    Contact Number: [phone]

## 2018-08-22 NOTE — PROGRESS NOTES
Occupational Therapy   Occupational Therapy Initial Assessment and Treatment Note  Date: 2018   Patient Name: Ashleigh Pinon  MRN: [id number]     : 1955    Date of Service: 2018    Discharge Recommendations:  Home with Home health OT, S Level 3     Patient Diagnosis(es): The encounter diagnosis was Numbness and tingling. has a past medical history of Aneurysm ([facility] [address] Asthma; Cerebral artery occlusion with cerebral infarction ([facility] [address] Hepatitis C; and Hypertension. has a past surgical history that includes Colonoscopy (). Restrictions  Restrictions/Precautions  Restrictions/Precautions: General Precautions, Fall Risk  Position Activity Restriction  Other position/activity restrictions: up with assist  Subjective   General  Chart Reviewed: Yes  Patient assessed for rehabilitation services?: Yes  Additional Pertinent Hx: hx R hemorrhagic CVA with residual R side weakness   Family / Caregiver Present: No  Referring Practitioner: ANGELINA Jose  Diagnosis: CVA  General Comment  Comments: RN approved pt for therapy  Pain Assessment  Patient Currently in Pain: Denies     Social/Functional History  Social/Functional History  Lives With: Alone  Type of Home: House  Home Layout: Two level, Able to Live on Main level with bedroom/bathroom (Does not use 2nd level)  Home Access: Level entry  Bathroom Shower/Tub: Walk-in shower  Bathroom Toilet: Standard  Bathroom Equipment: Grab bars in shower, Shower chair  Home Equipment: Cane  ADL Assistance: Independent  Homemaking Assistance: Independent ([employer] takes pt to store.  Pt independent with meal prep, laundry, cleaning)  Ambulation Assistance: Independent (cane )  Transfer Assistance: Independent  Active : No  Mode of Transportation: Friends  Leisure & Hobbies: TV, radio      Objective   Vision:  (States he needs reading glasses)  Hearing: Within functional limits    Orientation  Overall Orientation Status: Within Functional Limits Balance  Sitting Balance: Independent  Standing Balance: Contact guard assistance (RW )  Standing Balance  Activity: Decreased R foot clearance but pt reports that he normally walks this way since previous CVA. Sit to stand: Stand by assistance  Comment: Pt able to grasp walker with R hand consistently during mobility. Functional Mobility  Functional - Mobility Device: Rolling Walker  Activity: To/from bathroom  Assist Level: Contact guard assistance  Toilet Transfers  Toilet - Technique: Ambulating  Equipment Used: Standard toilet  Toilet Transfer: Stand by assistance;Contact guard assistance (from standard height toilet)  ADL  Grooming: Independent (for hair)  LE Dressing: Minimal assistance (adjust pants)  Toileting: Contact guard assistance  Tone RUE  RUE Tone: Hypotonic  Tone Description: low tone compared to RUE but pt able to use RUE as assist during ADLs  Tone LUE  LUE Tone: Normotonic  Coordination  Movements Are Fluid And Coordinated: No  Coordination and Movement description: Right UE;Decreased accuracy; Decreased speed     Bed mobility  Supine to Sit: Modified independent (HOB elevated)  Comment: Pt seated EOB at end of session   Transfers  Stand Pivot Transfers: Contact guard assistance (RW)  Sit to stand: Stand by assistance     Cognition  Overall Cognitive Status: WFL  Perception  Overall Perceptual Status: WFL     Sensation  Overall Sensation Status: Impaired (inconsistent sensation reports RLE; anticipate mildly impaired)      LUE AROM (degrees)  LUE AROM : WFL  RUE AROM (degrees)  RUE General AROM: R shoulder 0-90 flex/abd, R elbow 0-100, R supin 0-45, R wrist ext 0-40, able to flex fingers into fist  LUE Strength  Gross LUE Strength: WFL  RUE Strength  RUE Strength Comment: RUE 3/5-3+/5      Assessment   Performance deficits / Impairments: Decreased functional mobility ; Decreased ADL status; Decreased strength;Decreased coordination  Assessment: OT eval completed.  Pt reports that he feels he

## 2018-08-22 NOTE — PROGRESS NOTES
Physical Therapy    Facility/Department: [facility] A2 CARD TELEMETRY  Initial Assessment    NAME: Kaylee Gomez  : 1955  MRN: [id number]    Date of Service: 2018    Discharge Recommendations:  Home with assist PRN S Level 3, Home with Home health PT   PT Equipment Recommendations  Equipment Needed: Yes  Mobility Devices: Alicia Shield: Rolling    Patient Diagnosis(es): The encounter diagnosis was Numbness and tingling. has a past medical history of Aneurysm ([facility] [address] Asthma; Cerebral artery occlusion with cerebral infarction ([facility] [address] Hepatitis C; and Hypertension. has a past surgical history that includes Colonoscopy ().     Restrictions  Restrictions/Precautions  Restrictions/Precautions: General Precautions, Fall Risk  Position Activity Restriction  Other position/activity restrictions: up with assist  Vision/Hearing  Vision:  (States he needs reading glasses)     Subjective  General  Chart Reviewed: Yes  Patient assessed for rehabilitation services?: Yes  Additional Pertinent Hx: H/o CVA with right sided deficits; hep C  Response To Previous Treatment: Not applicable  Family / Caregiver Present: No  Referring Practitioner: Dr. June Hoyt MD  Referral Date : 18  Diagnosis: CVA  Follows Commands: Within Functional Limits  General Comment  Comments: Pt seated EOB on approach; RN cleared pt for therapy  Subjective  Subjective: pt agreeable to therapy  Pain Screening  Patient Currently in Pain: Denies       Orientation  Orientation  Overall Orientation Status: Within Functional Limits    Social/Functional History  Social/Functional History  Lives With: Alone  Type of Home: House  Home Layout: Two level, Able to Live on Main level with bedroom/bathroom (Does not use 2nd level)  Home Access: Level entry  Bathroom Shower/Tub: Walk-in shower  Bathroom Toilet: Standard  Bathroom Equipment: Grab bars in shower, Shower chair  Home Equipment: Cane  ADL Assistance: [address]: Independent ([employer] takes pt to store. Pt independent with meal prep, laundry, cleaning)  Ambulation Assistance: Independent (cane )  Transfer Assistance: Independent  Active : No  Mode of Transportation: Friends  Leisure & Hobbies: TV, radio   Objective     RLE AROM: WFL  LLE AROM : WFL  Strength RLE  Comment: Hip flexion 4/5; knee ext 4/5; DF 4-/5; PF 5/5  Strength LLE  Strength LLE: WFL  Comment: Grossly 4+/5 to 5/5 throughout     Sensation  Overall Sensation Status: Impaired (inconsistent sensation reports RLE; anticipate mildly impaired)     Bed mobility  Supine to Sit: Unable to assess  Sit to Supine: Unable to assess  Comment: Pt seated EOB at start and end of session     Transfers  Sit to Stand: Contact guard assistance  Stand to sit: Contact guard assistance     Ambulation  Ambulation?: Yes  Ambulation 1  Surface: level tile  Device: Rolling Walker  Assistance: Contact guard assistance  Quality of Gait: Demonstrates antalgic gait with decreased stance RLE. No right foot drop noted but does have decreased DF eccentric control during swing phase. Cues for upright posture and to relax UEs  Distance: 100 ft     Balance  Sitting - Static: Good  Sitting - Dynamic: Good  Standing - Static: Fair;+  Standing - Dynamic: Fair;+        Assessment   Body structures, Functions, Activity limitations: Decreased functional mobility ; Decreased endurance;Decreased coordination;Decreased balance;Decreased strength  Assessment: Pt is 60 yo male who presents with diagnosis of CVA. Pt reporting tingling in RUE and face. Pt with history of prior CVA with right sided deficits. Per pt, sensation, strength, and mobility are baseline. Does demonstrate impaired LE coordination. No clonus noted. Pt would benefit from continued skilled therapy to address deficits and improve balance/gait mechanics. Recommend home with assist prn and home PT at d/c.   Treatment Diagnosis: decreased (I) with mobility  Specific instructions for Next Treatment: progress mobility as tolerated  Prognosis: Good  Decision Making: Medium Complexity  Patient Education: Role of PT, safety with mobility, POC, d/c recs; pt verbalized understanding  Barriers to Learning: none  REQUIRES PT FOLLOW UP: Yes  Activity Tolerance  Activity Tolerance: Patient Tolerated treatment well         Plan   Plan  Times per week: 3-5x/wk  Times per day: Daily  Specific instructions for Next Treatment: progress mobility as tolerated  Current Treatment Recommendations: Strengthening, Neuromuscular Re-education, Home Exercise Program, ROM, Safety Education & Training, Balance Training, Endurance Training, Functional Mobility Training, Transfer Training, Gait Training, Stair training  Safety Devices  Type of devices: All fall risk precautions in place, Call light within reach, Bed alarm in place, Gait belt, Patient at risk for falls, Nurse notified, Left in bed    G-Code  PT G-Codes  Functional Assessment Tool Used: [facility]  Score: 17  Functional Limitation: Mobility: Walking and moving around  Mobility: Walking and Moving Around Current Status (): At least 20 percent but less than 40 percent impaired, limited or restricted  Mobility: Walking and Moving Around Goal Status (): At least 1 percent but less than 20 percent impaired, limited or restricted  OutComes Score                                           AM-PAC Score     AM-PAC Inpatient Mobility without Stair Climbing Raw Score : 17  AM-PAC Inpatient without Stair Climbing T-Scale Score : 48.47  Mobility Inpatient CMS 0-100% Score: 32.72  Mobility Inpatient without Stair CMS G-Code Modifier : CJ       Goals  Short term goals  Time Frame for Short term goals: 1 week  Short term goal 1: Pt will be indep with bed mobility. Short term goal 2: Pt will be mod I with sit<>Stand and bed<>chair transfers with LRAD. Short term goal 3: Pt will ambulation 100 ft with LRAD and mod I. Short term goal 4: 8/25/18:  Pt will participate in 12-15 reps of BLE exercises to promote strength and activity tolerance.   Patient Goals   Patient goals : \"to get better\"       Therapy Time   Individual Concurrent Group Co-treatment   Time In 7097         Time Out 0903         Minutes 20         Timed Code Treatment Minutes: SONY [place]

## 2018-08-22 NOTE — CONSULTS
Mother     Heart Disease Father     Diabetes Father     High Blood Pressure Father     High Cholesterol Father      Past Surgical History:   Procedure Laterality Date    COLONOSCOPY  2016    polyps - per patient     Past Surgical History:   Procedure Laterality Date    COLONOSCOPY  2016    polyps - per patient     Family History   Problem Relation Age of Onset    Other Mother         aneurysm    Diabetes Mother     High Blood Pressure Mother     High Cholesterol Mother     Heart Disease Father     Diabetes Father     High Blood Pressure Father     High Cholesterol Father      Social History   Substance Use Topics    Smoking status: Never Smoker    Smokeless tobacco: Never Used    Alcohol use 44.4 oz/week     74 Cans of beer per week      Comment: 12 pk a night     Allergies   Allergen Reactions    Gadolinium Derivatives Anaphylaxis    Iodinated Diagnostic Agents Hives, Shortness Of Breath, Itching and Swelling    Ace Inhibitors      Other reaction(s): cough    Other      Current Facility-Administered Medications   Medication Dose Route Frequency Provider Last Rate Last Dose    albuterol sulfate  (90 Base) MCG/ACT inhaler 2 puff  2 puff Inhalation Q6H PRN Efrem Hunt MD        amLODIPine (NORVASC) tablet 10 mg  10 mg Oral Daily Efrem Hnut MD   10 mg at 08/22/18 0823    aspirin EC tablet 81 mg  81 mg Oral Daily Efrem Hunt MD   81 mg at 08/22/18 0824    atorvastatin (LIPITOR) tablet 40 mg  40 mg Oral Nightly Efrem Hunt MD        clopidogrel (PLAVIX) tablet 75 mg  75 mg Oral Daily Efrem Hunt MD   75 mg at 08/22/18 3778    losartan (COZAAR) tablet 100 mg  100 mg Oral Daily Efrem Hunt MD   100 mg at 08/22/18 0823    Fluticasone Furoate-Vilanterol 200-25 MCG/INH AEPB 1 puff  1 puff Inhalation BID Efrem Hunt MD        vitamin B-1 (THIAMINE) tablet 100 mg  100 mg Oral Daily Efrem Hunt MD   100 mg at 08/22/18 4378    traZODone (DESYREL) tablet 50 mg  50 mg Oral Nightly Flor Diaz MD   50 mg at 08/21/18 2123    sodium chloride flush 0.9 % injection 10 mL  10 mL Intravenous 2 times per day Flor Diaz MD   10 mL at 08/22/18 7151    sodium chloride flush 0.9 % injection 10 mL  10 mL Intravenous PRN Flor Diaz MD        magnesium hydroxide (MILK OF MAGNESIA) 400 MG/5ML suspension 30 mL  30 mL Oral Daily PRN Flor Diaz MD        ondansetron (ZOFRAN) injection 4 mg  4 mg Intravenous Q6H PRN Flor Diaz MD           ROS : A 10-12 system review of constitutional, cardiovascular, respiratory, musculoskeletal, endocrine, skin, hematological, SHEENT, genitourinary, psychiatric and neurologic systems was obtained and updated today and is unremarkable except as mentioned in my HPI      Exam:   Constitutional:   Vitals:    08/21/18 2358 08/22/18 0338 08/22/18 0658 08/22/18 0710   BP: (!) 143/79 (!) 154/85 (!) 150/74 (!) 144/81   Pulse: 61 53 (!) 49 50   Resp: 18 20 20 14   Temp: 97.7 °F (36.5 °C) 98.5 °F (36.9 °C) 98.3 °F (36.8 °C) 98.5 °F (36.9 °C)   TempSrc: Oral Oral Oral Oral   SpO2: 94% 96% 98% 97%   Weight:       Height:           General appearance: NAD  Eye: No icterus. No blurring of optic disc. Neck: supple  Cardiovascular: No carotid bruit. No lower leg edema with good pulsation. Mental Status: Oriented to person, place, problem, and time. Fluent speech. Good fund of knowledge. Normal attention span and concentration. Cranial Nerves:   II: Visual fields: Full to confrontation  III: Pupils: equal, round, reactive to light  III,IV,VI: Extra Ocular Movements are intact.  No nystagmus  V: Facial sensation is intact to pin prick and light touch  VII: Facial strength and movements: intact and symmetric smile,cheek puffing and eyebrow elevation  VIII: Hearing: Intact to finger rub bilaterally  IX: Palate elevation is symmetric  XI: Shoulder shrug is intact  XII: Tongue movements are normal  Musculoskeletal: 5/5 in all 4 extremities. Normal tone. Reflexes: Bilateral biceps 2/4, triceps 2/4, brachial radialis 2/4, knee 2/4 and ankle 2/4. Planters: flexor bilaterally. Coordination: no pronator drift, no dysmetria. Finger nose finger testing within normal limits. Sensation: normal to all modalities. Gait/Posture: steady    Data:  LABS:   Lab Results   Component Value Date     08/22/2018    K 4.3 08/22/2018     08/22/2018    CO2 24 08/22/2018    BUN 18 08/22/2018    CREATININE 1.3 08/22/2018    GFRAA >60 08/22/2018    GFRAA >60 12/26/2012    LABGLOM 56 08/22/2018    GLUCOSE 107 08/22/2018    MG 2.60 04/21/2018    CALCIUM 9.0 08/22/2018     Lab Results   Component Value Date    WBC 4.9 08/22/2018    RBC 5.17 08/22/2018    HGB 15.5 08/22/2018    HCT 45.3 08/22/2018    MCV 87.7 08/22/2018    RDW 14.3 08/22/2018     08/22/2018     Lab Results   Component Value Date    INR 1.18 (H) 04/15/2018    PROTIME 13.3 (H) 04/15/2018       Neuroimaging and/or  labs reviewed by me and discussed results with the patient    Impression:  Recurrent episode of right-sided focal muscle twitches and tonic activity concerning for focal seizure. 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 Other differential could include TIA. Remote left hemorrhagic CVA  Hypertension  Chronic hepatitis C  Hyperlipidemia     Recommendation:  EEG  Awaiting echo and Doppler results  No need for dual AP therapy. Would recommend either aspirin or Plavix  Statin  Blood pressure monitor and control  Start Keppra 500 mg twice daily  Seizure precautions, risk of falling and injury and driving restrictions were discussed  Discussed side effects from [address] home blood pressure medications  Monitor blood pressure  PT and OT  Telemetry  Will follow       Thank you for referring such patient. If you have any questions regarding my consult note, please don't hesitate to call me.      Liza Ghosh MD  [phone]    This dictation

## 2018-08-23 VITALS
HEIGHT: 67 IN | TEMPERATURE: 97 F | DIASTOLIC BLOOD PRESSURE: 92 MMHG | SYSTOLIC BLOOD PRESSURE: 184 MMHG | RESPIRATION RATE: 16 BRPM | OXYGEN SATURATION: 96 % | WEIGHT: 202.06 LBS | BODY MASS INDEX: 31.71 KG/M2 | HEART RATE: 84 BPM

## 2018-08-23 PROCEDURE — 6360000002 HC RX W HCPCS: Performed by: NURSE PRACTITIONER

## 2018-08-23 PROCEDURE — 97535 SELF CARE MNGMENT TRAINING: CPT

## 2018-08-23 PROCEDURE — G8987 SELF CARE CURRENT STATUS: HCPCS

## 2018-08-23 PROCEDURE — 97116 GAIT TRAINING THERAPY: CPT

## 2018-08-23 PROCEDURE — 6370000000 HC RX 637 (ALT 250 FOR IP): Performed by: PSYCHIATRY & NEUROLOGY

## 2018-08-23 PROCEDURE — 95816 EEG AWAKE AND DROWSY: CPT | Performed by: PSYCHIATRY & NEUROLOGY

## 2018-08-23 PROCEDURE — 97110 THERAPEUTIC EXERCISES: CPT

## 2018-08-23 PROCEDURE — 6370000000 HC RX 637 (ALT 250 FOR IP): Performed by: INTERNAL MEDICINE

## 2018-08-23 PROCEDURE — 99232 SBSQ HOSP IP/OBS MODERATE 35: CPT | Performed by: PSYCHIATRY & NEUROLOGY

## 2018-08-23 PROCEDURE — 2580000003 HC RX 258: Performed by: INTERNAL MEDICINE

## 2018-08-23 PROCEDURE — 2580000003 HC RX 258: Performed by: NURSE PRACTITIONER

## 2018-08-23 PROCEDURE — 95816 EEG AWAKE AND DROWSY: CPT

## 2018-08-23 RX ORDER — LORAZEPAM 1 MG/1
3 TABLET ORAL
Status: DISCONTINUED | OUTPATIENT
Start: 2018-08-23 | End: 2018-08-23 | Stop reason: HOSPADM

## 2018-08-23 RX ORDER — LORAZEPAM 1 MG/1
2 TABLET ORAL
Status: DISCONTINUED | OUTPATIENT
Start: 2018-08-23 | End: 2018-08-23 | Stop reason: HOSPADM

## 2018-08-23 RX ORDER — LORAZEPAM 2 MG/ML
2 INJECTION INTRAMUSCULAR
Status: DISCONTINUED | OUTPATIENT
Start: 2018-08-23 | End: 2018-08-23 | Stop reason: HOSPADM

## 2018-08-23 RX ORDER — LEVETIRACETAM 500 MG/1
500 TABLET ORAL 2 TIMES DAILY
Qty: 60 TABLET | Refills: 3 | Status: SHIPPED | OUTPATIENT
Start: 2018-08-23 | End: 2018-09-27 | Stop reason: SDUPTHER

## 2018-08-23 RX ORDER — LORAZEPAM 1 MG/1
1 TABLET ORAL
Status: DISCONTINUED | OUTPATIENT
Start: 2018-08-23 | End: 2018-08-23 | Stop reason: HOSPADM

## 2018-08-23 RX ORDER — SODIUM CHLORIDE 0.9 % (FLUSH) 0.9 %
10 SYRINGE (ML) INJECTION EVERY 12 HOURS SCHEDULED
Status: DISCONTINUED | OUTPATIENT
Start: 2018-08-23 | End: 2018-08-23 | Stop reason: HOSPADM

## 2018-08-23 RX ORDER — LORAZEPAM 1 MG/1
4 TABLET ORAL
Status: DISCONTINUED | OUTPATIENT
Start: 2018-08-23 | End: 2018-08-23 | Stop reason: HOSPADM

## 2018-08-23 RX ORDER — LORAZEPAM 2 MG/ML
1 INJECTION INTRAMUSCULAR
Status: DISCONTINUED | OUTPATIENT
Start: 2018-08-23 | End: 2018-08-23 | Stop reason: HOSPADM

## 2018-08-23 RX ORDER — LORAZEPAM 2 MG/ML
4 INJECTION INTRAMUSCULAR
Status: DISCONTINUED | OUTPATIENT
Start: 2018-08-23 | End: 2018-08-23 | Stop reason: HOSPADM

## 2018-08-23 RX ORDER — SODIUM CHLORIDE 0.9 % (FLUSH) 0.9 %
10 SYRINGE (ML) INJECTION PRN
Status: DISCONTINUED | OUTPATIENT
Start: 2018-08-23 | End: 2018-08-23 | Stop reason: HOSPADM

## 2018-08-23 RX ORDER — LORAZEPAM 2 MG/ML
3 INJECTION INTRAMUSCULAR
Status: DISCONTINUED | OUTPATIENT
Start: 2018-08-23 | End: 2018-08-23 | Stop reason: HOSPADM

## 2018-08-23 RX ADMIN — ASPIRIN 81 MG: 81 TABLET, COATED ORAL at 09:28

## 2018-08-23 RX ADMIN — Medication 100 MG: at 09:28

## 2018-08-23 RX ADMIN — LEVETIRACETAM 500 MG: 500 TABLET ORAL at 09:28

## 2018-08-23 RX ADMIN — Medication 10 ML: at 09:28

## 2018-08-23 RX ADMIN — CLOPIDOGREL BISULFATE 75 MG: 75 TABLET ORAL at 09:28

## 2018-08-23 RX ADMIN — ENOXAPARIN SODIUM 40 MG: 40 INJECTION SUBCUTANEOUS at 10:28

## 2018-08-23 RX ADMIN — LOSARTAN POTASSIUM 100 MG: 100 TABLET, FILM COATED ORAL at 09:28

## 2018-08-23 RX ADMIN — AMLODIPINE BESYLATE 10 MG: 5 TABLET ORAL at 09:28

## 2018-08-23 RX ADMIN — SODIUM CHLORIDE, PRESERVATIVE FREE 10 ML: 5 INJECTION INTRAVENOUS at 10:29

## 2018-08-23 NOTE — PROGRESS NOTES
injection 3 mg  3 mg Intravenous T8P PRN Annabella Cedar, APRN - CNP        Or    LORazepam (ATIVAN) tablet 4 mg  4 mg Oral H8I PRN Annabella Cedar, APRN - CNP        Or    LORazepam (ATIVAN) injection 4 mg  4 mg Intravenous M1E PRN Annabella Cedar, APRN - CNP        levETIRAcetam (KEPPRA) tablet 500 mg  500 mg Oral BID Julio Serna MD   500 mg at 08/23/18 0928    albuterol sulfate  (90 Base) MCG/ACT inhaler 2 puff  2 puff Inhalation Q6H PRN Mercy Montejo MD        amLODIPine (NORVASC) tablet 10 mg  10 mg Oral Daily Mercy Montejo MD   10 mg at 08/23/18 1630    aspirin EC tablet 81 mg  81 mg Oral Daily Mercy Montejo MD   81 mg at 08/23/18 9019    atorvastatin (LIPITOR) tablet 40 mg  40 mg Oral Nightly Mercy Montejo MD   40 mg at 08/22/18 2035    clopidogrel (PLAVIX) tablet 75 mg  75 mg Oral Daily Mercy Montejo MD   75 mg at 08/23/18 6567    losartan (COZAAR) tablet 100 mg  100 mg Oral Daily Mercy Montejo MD   100 mg at 08/23/18 0928    Fluticasone Furoate-Vilanterol 200-25 MCG/INH AEPB 1 puff  1 puff Inhalation BID Mercy Montejo MD        vitamin B-1 (THIAMINE) tablet 100 mg  100 mg Oral Daily Mercy Montejo MD   100 mg at 08/23/18 7117    traZODone (DESYREL) tablet 50 mg  50 mg Oral Nightly Mercy Montejo MD   50 mg at 08/22/18 2035    sodium chloride flush 0.9 % injection 10 mL  10 mL Intravenous 2 times per day Mercy Montejo MD   10 mL at 08/23/18 3703    sodium chloride flush 0.9 % injection 10 mL  10 mL Intravenous PRN Mercy Montejo MD        magnesium hydroxide (MILK OF MAGNESIA) 400 MG/5ML suspension 30 mL  30 mL Oral Daily PRN Mercy Montejo MD        ondansetron (ZOFRAN) injection 4 mg  4 mg Intravenous Q6H PRN Mercy Montejo MD         Allergies   Allergen Reactions    Gadolinium Derivatives Anaphylaxis    Iodinated Diagnostic Agents Hives, Shortness Of Breath, Itching and Swelling    Ace Inhibitors      Other reaction(s):

## 2018-08-23 NOTE — PROGRESS NOTES
Physical Therapy  Facility/Department: [facility] A2 CARD TELEMETRY  Daily Treatment Note  NAME: Jarred Butts  : 1955  MRN: [id number]    Date of Service: 2018    Discharge Recommendations:  Home with assist PRN, S Level 3, Home with Home health PT   PT Equipment Recommendations  Equipment Needed: Yes  Mobility Devices: Melvena Samuel: Rolling    Patient Diagnosis(es): The encounter diagnosis was Numbness and tingling. has a past medical history of Aneurysm ([facility] [address] Asthma; Cerebral artery occlusion with cerebral infarction ([facility] [address] Hepatitis C; and Hypertension. has a past surgical history that includes Colonoscopy (). Restrictions  Restrictions/Precautions: General Precautions, Fall Risk  Other position/activity restrictions: up with assist  Subjective   Chart Reviewed: Yes  Additional Pertinent Hx: H/o CVA with right sided deficits; hep C  Response To Previous Treatment: Patient with no complaints from previous session. Family / Caregiver Present: No  Referring Practitioner: Dr. Belinda Hussein MD  Subjective: pt agrees to therapy, denies pain  Comments: Pt up in chair on approacH, RN cleared pt for PT  Pain Screening  Patient Currently in Pain: Denies       Orientation  Overall Orientation Status: Within Functional Limits  Objective   Bed mobility  Supine to Sit:  (up in chair on approach)  Sit to Supine: Independent  Comment: Returned to bed at end of session  Transfers  Sit to Stand: Modified independent  Stand to sit: Modified independent  Ambulation  Surface: level tile  Device: Harrison rail; No Device  Assistance: Stand by assistance  Quality of Gait: Pt with decreased RLE stance phase, shortened stride RLE. These deficits worsen as pt tires.  For this reason recommend RW for long distances, cane short distances  Distance: 200 ft   Exercises  Hip Flexion: Standing march holding harrison rail 12 x B  Hip Extension/Leg Presses: Standing alternate hip ext holding harrison rail 12 x B  Hip Abduction: Standing week: 3-5x/wk  Times per day: Daily  Specific instructions for Next Treatment: progress mobility as tolerated  Current Treatment Recommendations: Strengthening, Neuromuscular Re-education, Home Exercise Program, ROM, Safety Education & Training, Balance Training, Endurance Training, Functional Mobility Training, Transfer Training, Gait Training, Stair training  Safety Devices  Type of devices:  All fall risk precautions in place, Call light within reach, Bed alarm in place, Gait belt, Patient at risk for falls, Nurse notified, Left in bed     Therapy Time   Individual Concurrent Group Co-treatment   Time In [address] [place], [place] [id number]

## 2018-08-23 NOTE — PROGRESS NOTES
Pt d/c'd home. Removed peripheral IV and stopped bleeding. Catheter intact. Pt tolerated well. No redness noted at site. Notified [facility] and removed tele box. Reviewed d/c instructions, home meds, and  f/u information utilizing teach-back method. Scripts for Keppra given to patient. Patient verbalized understanding. Patient discharged with all belongings.

## 2018-08-23 NOTE — PLAN OF CARE
Patient's EF (Ejection Fraction) is greater than 40%    Patient has a past medical history of Aneurysm ([facility] [address] Asthma; Cerebral artery occlusion with cerebral infarction ([facility] [address] Hepatitis C; and Hypertension. Comorbidities reviewed and education provided. Patient and family's stated goal of care: be more comfortable prior to discharge    Patient's current functional capacity:  No limitation of physical activity. Ordinary physical activity does not cause undue fatigue, palpitation, dyspnea. Pt resting in bed at this time on room air. Pt denies shortness of breath. Pt without lower extremity edema. Patient's weights and intake/output reviewed:    Patient Vitals for the past 96 hrs (Last 3 readings):   Weight   08/21/18 1216 204 lb 2 oz (92.6 kg)       Intake/Output Summary (Last 24 hours) at 08/23/18 0350  Last data filed at 08/22/18 1923   Gross per 24 hour   Intake              240 ml   Output                0 ml   Net              240 ml       Patient provided with education on CHF signs/symptoms, causes, discharge medications, daily weights, low sodium diet, activity, and follow-up. Notified patient to call the doctor post discharge if patient experiences shortness of breath, chest pain, swelling, cough, or weight gain of three pounds in a day/five pounds in a week. Also notified patient to call the doctor with dizziness, increased fatigue, decreased or difficulty urinating. Pt verbalized understanding. No additional questions at this time.     Education Time: 5 Minutes

## 2018-08-23 NOTE — PROGRESS NOTES
Hospitalist Progress Note      PCP: Al Sigala MD    Date of Admission: 8/21/2018    Chief Complaint:   Chief Complaint   Patient presents with    Numbness     numbness and tingling and cramping on right side started one hour ago.  Loss of Vision     Pt complaines of blurry vision for one hour. Hospital Course: 61 y.o. male who presented to [facility] with above complaint. He denies change in mental status, slurred speech, swallowing difficulty, palpitation, dizziness, syncope, chest pain, shortness of breath, abdominal pain or lower extremity edema. Subjective:   Pt concerned about twitching and \"spasms\" of R arm and leg  Concerning for seizure. Pending neuro consult. Medications:  Reviewed    Infusion Medications   Scheduled Medications    levETIRAcetam  500 mg Oral BID    amLODIPine  10 mg Oral Daily    aspirin EC  81 mg Oral Daily    atorvastatin  40 mg Oral Nightly    clopidogrel  75 mg Oral Daily    losartan  100 mg Oral Daily    Fluticasone Furoate-Vilanterol  1 puff Inhalation BID    thiamine  100 mg Oral Daily    traZODone  50 mg Oral Nightly    sodium chloride flush  10 mL Intravenous 2 times per day     PRN Meds: albuterol sulfate HFA, sodium chloride flush, magnesium hydroxide, ondansetron      Intake/Output Summary (Last 24 hours) at 08/23/18 0921  Last data filed at 08/22/18 1923   Gross per 24 hour   Intake              240 ml   Output                0 ml   Net              240 ml       Physical Exam Performed:    BP (!) 153/86   Pulse 57   Temp 98.5 °F (36.9 °C) (Oral)   Resp 16   Ht 5' 7\" (1.702 m)   Wt 202 lb 1 oz (91.7 kg)   SpO2 96%   BMI 31.65 kg/m²     General appearance: No apparent distress, appears stated age and cooperative. HEENT: Pupils equal, round, and reactive to light. Conjunctivae/corneas clear. Neck: Supple, with full range of motion. No jugular venous distention. Trachea midline. Respiratory:  Normal respiratory effort. right pleural effusion         CT HEAD WO CONTRAST   Final Result   No acute intracranial abnormality. Critical results were called by Dr. Meche Ramos MD to Porter Laura   on 8/21/2018 at 12:37. Assessment/Plan:    Active Hospital Problems    Diagnosis Date Noted    New onset seizure ([facility] [R56.9]     Numbness and tingling [R20.0, R20.2]     Epilepsy, focal ([facility] [G40.109]     HTN (hypertension), benign [I10]     Cerebrovascular accident (CVA) determined by clinical assessment ([facility] [I63.9] 08/21/2018    Asthma [J45.909] 08/21/2018    HLD (hyperlipidemia) [E78.5] 08/20/2016    HTN (hypertension) [I10] 08/20/2016    Chronic hepatitis C virus infection ([facility] [B18.2] 08/20/2016    Remote history of stroke [Z86.73]      New onset seizure, suspected  MRI Neg for CVA. Cardotid dopp noted  Pending neuro consult  Add CIWA, Seizure precaution. TBI Hx with seizures. Chronic CHF unknown type, patient had a echocardiogram in system on 6/29/2016 showing LVEF of 55%, currently chest x-ray showing pulmonary edema and pleural effusion, although Pro-BNP negative. -  Lasix  - daily weight, I&O     History of hemorrhagic stroke due to aneurysm, currently stable, on statin and Plavix, continue to monitor.     Hypertension, stable with current regimen, continue home HCTZ, clonidine, amlodipine and valsartan.     Hypokalemia - likely due to diuretic use.  Replaced with  PO      Alcohol abuse- Drinks a 6 pack a day, admittedly.    Placed on CIWA     DVT Prophylaxis: Lovenox  Diet: DIET LOW SODIUM 2 GM;  Dietary Nutrition Supplements: Standard High Calorie Oral Supplement  Code Status: Full Code    PT/OT Eval Status: active and ongoing      Dispo - pending neuro recs    NIR Dominguez - CNP

## 2018-08-23 NOTE — PROGRESS NOTES
Occupational Therapy  Facility/Department: [facility] A2 CARD TELEMETRY  Daily Treatment Note  NAME: Desirae Hutchinson  : 1955  MRN: [id number]    Date of Service: 2018    Discharge Recommendations:  Home with assist PRN, Home with Home health OT, S Level 3     Patient Diagnosis(es): The encounter diagnosis was Numbness and tingling. has a past medical history of Aneurysm ([facility] [address] Asthma; Cerebral artery occlusion with cerebral infarction ([facility] [address] Hepatitis C; and Hypertension. has a past surgical history that includes Colonoscopy (). Restrictions  Restrictions/Precautions  Restrictions/Precautions: General Precautions, Fall Risk  Position Activity Restriction  Other position/activity restrictions: up with assist  Subjective   General  Chart Reviewed: Yes  Patient assessed for rehabilitation services?: Yes  Additional Pertinent Hx: hx R hemorrhagic CVA with residual R side weakness   Response to previous treatment: Patient with no complaints from previous session  Family / Caregiver Present: No  Referring Practitioner: Jeison Valladares  Diagnosis: CVA  General Comment  Comments: RN approved pt for therapy  Pain Assessment  Patient Currently in Pain: Denies  Vital Signs  Patient Currently in Pain: Denies   Orientation  Orientation  Overall Orientation Status: Within Functional Limits  Objective    ADL  Grooming: Independent (at sink to wash face and comb hair )  LE Dressing: Independent (tie pants and don socks )      Balance  Sitting Balance: Independent  Standing Balance: Stand by assistance (RW)  Standing Balance  Time: x3 min with RW  Activity: Decreased R foot clearance but pt reports that he normally walks this way since previous CVA. Sit to stand: Supervision  Stand to sit: Stand by assistance  Comment: Pt able to grasp walker with R hand consistently during mobility.    Functional Mobility  Functional - Mobility Device: Rolling Walker  Activity: To/from bathroom  Assist Level: Stand by

## 2018-08-23 NOTE — PLAN OF CARE
Problem: OXYGENATION/RESPIRATORY FUNCTION  Goal: Patient will maintain patent airway  Outcome: Ongoing  Patient's EF (Ejection Fraction) is greater than 40%    Patient has a past medical history of Aneurysm ([facility] [facility] Asthma; Cerebral artery occlusion with cerebral infarction ([facility] [address] [facility] Hepatitis C; and Hypertension. Comorbidities reviewed and education provided. Patient and family's stated goal of care: be more comfortable prior to discharge    Patient's current functional capacity:  Slight limitation of physical activity. Comfortable at rest. Ordinary physical activity results in fatigue, palpitation, dyspnea. Pt up in chair at this time on room air. Pt denies shortness of breath. Pt without lower extremity edema. Patient's weights and intake/output reviewed:    Patient Vitals for the past 96 hrs (Last 3 readings):   Weight   08/23/18 0559 202 lb 1 oz (91.7 kg)   08/21/18 1216 204 lb 2 oz (92.6 kg)       Intake/Output Summary (Last 24 hours) at 08/23/18 1043  Last data filed at 08/22/18 1923   Gross per 24 hour   Intake              240 ml   Output                0 ml   Net              240 ml       Patient provided with education on CHF signs/symptoms, causes, discharge medications, daily weights, low sodium diet, activity, and follow-up. Notified patient to call the doctor post discharge if patient experiences shortness of breath, chest pain, swelling, cough, or weight gain of three pounds in a day/five pounds in a week. Also notified patient to call the doctor with dizziness, increased fatigue, decreased or difficulty urinating. Pt education needs reinforcement. No additional questions at this time.     Education Time: 5 Minutes

## 2018-08-23 NOTE — PROGRESS NOTES
Pt trasnported off floor for MRI and to vascular. Tele order placed for removal during MRI; [address] notified. Tele replaced when pt returned to floor.

## 2018-08-24 LAB
EKG ATRIAL RATE: 66 BPM
EKG DIAGNOSIS: NORMAL
EKG P AXIS: 37 DEGREES
EKG P-R INTERVAL: 136 MS
EKG Q-T INTERVAL: 442 MS
EKG QRS DURATION: 162 MS
EKG QTC CALCULATION (BAZETT): 463 MS
EKG R AXIS: 104 DEGREES
EKG T AXIS: 37 DEGREES
EKG VENTRICULAR RATE: 66 BPM

## 2018-08-24 NOTE — CARE COORDINATION
Patient discharged home with home care and [facility] Liaison for [facility] referred patient to [facility] since they take his [place]

## 2018-08-30 RX ORDER — AMLODIPINE BESYLATE 10 MG/1
10 TABLET ORAL DAILY
Qty: 30 TABLET | Refills: 3 | Status: SHIPPED | OUTPATIENT
Start: 2018-08-30 | End: 2018-11-17 | Stop reason: SDUPTHER

## 2018-09-04 NOTE — DISCHARGE SUMMARY
Hospital Medicine Discharge Summary    Patient ID: Ashleigh Pinon      Patient's PCP: Aisha Lees MD    Admit Date: 8/21/2018     Discharge Date: 8/23/2018      Admitting Physician: Jacquie Scheuermann, MD     Discharge Physician: NIR Castro - CNP     Discharge Diagnoses: Active Hospital Problems    Diagnosis Date Noted    New onset seizure ([address] [R56.9]     Numbness and tingling [R20.0, R20.2]     Epilepsy, focal ([address] [G40.109]     HTN (hypertension), benign [I10]     Cerebrovascular accident (CVA) determined by clinical assessment ([address] [I63.9] 08/21/2018    Asthma [J45.909] 08/21/2018    HLD (hyperlipidemia) [E78.5] 08/20/2016    HTN (hypertension) [I10] 08/20/2016    Chronic hepatitis C virus infection ([facility] [address] [facility] [B18.2] 08/20/2016    Remote history of stroke [Z86.73]        The patient was seen and examined on day of discharge and this discharge summary is in conjunction with any daily progress note from day of discharge. Hospital Course: The patient is a 61y.o.  years old male with history of hypertension, hepatitis C and prior CVA who was admitted to [facility] last night with acute right-sided numbness and tingling and blurred vision. Symptoms started yesterday few hours prior to admission. He reports sudden onset of numbness and tingling affecting his right face and right arm and leg. Degree was severe and duration was persistent. Symptoms improved within several minutes and then the patient had some recurrent episodes of right hand and foot contracture and stiffness for few minutes. No aphasia or dysarthria. No LOC or witnessed convulsions. No other triggers or other associated symptoms. .  No recent fever or chills or trauma. He came into the emergency room where he was evaluated. CT head showed no acute findings. He was eventually admitted. Today feels back to his baseline. No residual deficit.   He denies any prior history of seizure or recent fever or chills or head trauma. Further workup with MRI of the brain showed no acute stroke and remote left basal ganglia hemorrhagic CVA. Patient had a carotid Doppler today which so far showed no significant ICA stenosis. His currently on Plavix, aspirin and statin. No other new symptoms today. Other review of system was unremarkable.       New onset seizure, suspected  MRI Neg for CVA. Cardotid dopp noted  Neuro consult noted-f/u in office in 1 month. Keppra added  Added CIWA, Seizure precaution. TBI Hx with seizures.        Chronic CHF unknown type, patient had a echocardiogram in system on 6/29/2016 showing LVEF of 55%, currently chest x-ray showing pulmonary edema and pleural effusion, although Pro-BNP negative. History of hemorrhagic stroke due to aneurysm, currently stable, on statin and Plavix.     Hypertension, stable with current regimen, continue home HCTZ, clonidine, amlodipine and valsartan.     Hypokalemia - likely due to diuretic use.  Replaced with  PO      Alcohol abuse- Drinks a 6 pack a day, admittedly. Placed on CIWA     Physical Exam Performed:     BP (!) 184/92   Pulse 84   Temp 97 °F (36.1 °C) (Oral)   Resp 16   Ht 5' 7\" (1.702 m)   Wt 202 lb 1 oz (91.7 kg)   SpO2 96%   BMI 31.65 kg/m²       General appearance:  No apparent distress, appears stated age and cooperative. HEENT:  Normal cephalic, atraumatic without obvious deformity. Pupils equal, round, and reactive to light. Extra ocular muscles intact. Conjunctivae/corneas clear. Neck: Supple, with full range of motion. No jugular venous distention. Trachea midline. Respiratory:  Normal respiratory effort. Clear to auscultation, bilaterally without Rales/Wheezes/Rhonchi. Cardiovascular:  Regular rate and rhythm with normal S1/S2 without murmurs, rubs or gallops. Abdomen: Soft, non-tender, non-distended with normal bowel sounds. Musculoskeletal:  No clubbing, cyanosis or edema bilaterally.   Full range of motion without deformity. Skin: Skin color, texture, turgor normal.  No rashes or lesions. Neurologic:  Neurovascularly intact without any focal sensory/motor deficits. Cranial nerves: II-XII intact, grossly non-focal.  Psychiatric:  Alert and oriented, thought content appropriate, normal insight  Capillary Refill: Brisk,< 3 seconds   Peripheral Pulses: +2 palpable, equal bilaterally       Labs: For convenience and continuity at follow-up the following most recent labs are provided:      CBC:    Lab Results   Component Value Date    WBC 4.9 08/22/2018    HGB 15.5 08/22/2018    HCT 45.3 08/22/2018     08/22/2018       Renal:    Lab Results   Component Value Date     08/22/2018    K 4.3 08/22/2018     08/22/2018    CO2 24 08/22/2018    BUN 18 08/22/2018    CREATININE 1.3 08/22/2018    CALCIUM 9.0 08/22/2018         Significant Diagnostic Studies    Radiology:   Vascular carotid duplex bilateral   Final Result      MRI brain without contrast   Final Result   No evidence of acute ischemia. Small old hemorrhagic infarct involving the left basal ganglia and left   thalamus. Stable mild chronic microvascular disease within the periventricular white   matter of both cerebral hemispheres. Mild atrophy. XR CHEST STANDARD (2 VW)   Final Result   Stable chest with persistent trace right pleural effusion         CT HEAD WO CONTRAST   Final Result   No acute intracranial abnormality. Critical results were called by Dr. Romain Renee MD to Nithin Malone   on 8/21/2018 at 12:37.                 Consults:     IP CONSULT TO HOSPITALIST  IP CONSULT TO NEUROLOGY  IP CONSULT TO PHARMACY  IP CONSULT TO HOME CARE NEEDS    Disposition:  home     Condition at Discharge: Stable    Discharge Instructions/Follow-up:    Future Appointments  Date Time Provider Alicia Columba   9/6/2018 9:30 AM Alex Davis MD [address] [place] [address]   2/18/2019 1:00 PM Caroline Ornelas MD AND [facility] care. If you have any questions or concerns please feel free to contact me at [phone].

## 2018-09-05 RX ORDER — CLOPIDOGREL BISULFATE 75 MG/1
75 TABLET ORAL DAILY
Qty: 30 TABLET | Refills: 5 | Status: SHIPPED | OUTPATIENT
Start: 2018-09-05 | End: 2019-04-25 | Stop reason: SDUPTHER

## 2018-09-05 RX ORDER — ASPIRIN 81 MG/1
81 TABLET ORAL DAILY
Qty: 30 TABLET | Refills: 5 | Status: SHIPPED | OUTPATIENT
Start: 2018-09-05 | End: 2019-04-25 | Stop reason: SDUPTHER

## 2018-09-05 RX ORDER — SPIRONOLACTONE 25 MG/1
25 TABLET ORAL DAILY
Qty: 30 TABLET | Refills: 5 | Status: SHIPPED | OUTPATIENT
Start: 2018-09-05 | End: 2019-04-23 | Stop reason: SDUPTHER

## 2018-09-05 RX ORDER — ATORVASTATIN CALCIUM 40 MG/1
40 TABLET, FILM COATED ORAL NIGHTLY
Qty: 30 TABLET | Refills: 5 | Status: SHIPPED | OUTPATIENT
Start: 2018-09-05 | End: 2019-04-25 | Stop reason: SDUPTHER

## 2018-09-06 ENCOUNTER — OFFICE VISIT (OUTPATIENT)
Dept: OTHER | Age: 63
End: 2018-09-06

## 2018-09-06 VITALS
BODY MASS INDEX: 33.12 KG/M2 | SYSTOLIC BLOOD PRESSURE: 148 MMHG | WEIGHT: 211 LBS | HEIGHT: 67 IN | HEART RATE: 57 BPM | OXYGEN SATURATION: 96 % | DIASTOLIC BLOOD PRESSURE: 80 MMHG

## 2018-09-06 DIAGNOSIS — E78.2 MIXED HYPERLIPIDEMIA: ICD-10-CM

## 2018-09-06 DIAGNOSIS — I10 ESSENTIAL HYPERTENSION: Primary | ICD-10-CM

## 2018-09-06 DIAGNOSIS — B18.2 CHRONIC HEPATITIS C WITHOUT HEPATIC COMA (HCC): ICD-10-CM

## 2018-09-06 RX ORDER — CHLORTHALIDONE 25 MG/1
25 TABLET ORAL DAILY
Qty: 30 TABLET | Refills: 3 | Status: CANCELLED | OUTPATIENT
Start: 2018-09-06

## 2018-09-06 RX ORDER — POTASSIUM CHLORIDE 20 MEQ/1
20 TABLET, EXTENDED RELEASE ORAL DAILY
Qty: 60 TABLET | Refills: 3 | Status: CANCELLED | OUTPATIENT
Start: 2018-09-06

## 2018-09-06 NOTE — PROGRESS NOTES
SUBJECTIVE:   Follow up from hospilization August 21-23 with right upper and lower extremity cramping concerning for a stroke. Diagnosed with new onset seizure and prescribed Keppra 500 mg BID. He is followed by physical therapy with home health. History of hemorrhagic stroke due to aneurysm, currently stable, on statin and Plavix. History of excess alcohol intake (6-pack per day) without CIWA indicated lorazepam during admission. History of right hemothorax requiring chest tube which has resolved. He ran out of medication last week and blood pressure elevated as measured by home health nurse. patient in for follow-up after hospital stay for right hemothorax. He was seen here 2-3 weeks ago and was doing well after his hospital stay and he has no new complaints. He states his breathing is fine. He does not have any significant right-sided chest pain. He does mention again that he's having troubles with sleep. Previously, his been on Ambien and trazodone.   He also has a history of hepatitis C, which he states was fully treated.       MEDICATIONS:  atorvastatin (LIPITOR) 40 MG tablet Take 1 tablet by mouth nightly   clopidogrel (PLAVIX) 75 MG tablet Take 1 tablet by mouth daily   spironolactone (ALDACTONE) 25 MG tablet Take 1 tablet by mouth daily   aspirin EC 81 MG EC tablet Take 1 tablet by mouth daily   amLODIPine (NORVASC) 10 MG tablet Take 1 tablet by mouth daily   levETIRAcetam (KEPPRA) 500 MG tablet Take 1 tablet by mouth 2 times daily   losartan (COZAAR) 100 MG tablet Take 1 tablet by mouth daily   albuterol sulfate HFA (PROAIR HFA) 108 (90 Base) MCG/ACT  Inhale 2 puffs into the lungs every 6 hours as needed for Wheezing   Fluticasone Furoate-Vilanterol (BREO ELLIPTA IN) Inhale into the lungs 2 times daily Inhale two puffs twice daily   valsartan (DIOVAN) 160 MG tablet Take 1 tablet by mouth daily   traZODone (DESYREL) 50 MG tablet Take 50 mg by mouth nightly   topiramate (TOPAMAX) 25 MG tablet Take 2 tablets by mouth 2 times daily 2 po bid   Multiple Vitamins-Minerals (THERAPEUTIC MULTIVITAMIN-MINERALS) tablet Take 1 tablet by mouth daily   thiamine 100 MG tablet Take 1 tablet by mouth daily         Lab Results   Component Value Date    WBC 4.9 08/22/2018    HGB 15.5 08/22/2018     08/22/2018     Lab Results   Component Value Date     08/22/2018    K 4.3 08/22/2018     08/22/2018    CO2 24 08/22/2018    BUN 18 08/22/2018    CREATININE 1.3 08/22/2018    GLUCOSE 107 (H) 08/22/2018     CXR (8/21) Stable chest with persistent trace right pleural effusion. OBJECTIVE:    BP (!) 148/80 Comment: he was out of medication for 5 days. took them this morning  Pulse 57   Ht 5' 7\" (1.702 m)   Wt 211 lb (95.7 kg)   SpO2 96%   BMI 33.05 kg/m²   HEENT:  Oropharynx clear, no lymphadenopathy,   LUNGS:  Clear and without wheezes  HEART:  Regular rhythm, no appreciable murmur  ABD:  Benign, active bowel sounds  EXT:  No edema, pulses palpable  NEURO:  No focal neurologic deficits noted. ASSESSMENT / PLAN:   1. History of stroke:   Take aspirin 81 mg and clopidogrel (Plavix) 75 mg every morning.   Take atorvastatin (Lipitor) 40 mg every evening.    2. Hypertension (elevated blood pressure) with a low potassium:   Blood pressure is borderline elevated today at 148/80, goal less than 140/90. Take spironolactone (Aldactone) 25 mg tablet every morning.  Take losartan (Diovan) 160 mg every morning.  Also continue amlodipine (Norvasc) 10 mg every morning. 3. Diastolic heart failure:  This is relatively mild but can result in build up of fluid in the lungs.  Will add chlorthalidone and potassium replacement. 4. History of alcohol intake:  Advised to avoid further alcohol.  Continue taking thiamine (vitamin B1) 100 mg every morning.    5. History of asthma:  Use Symbicort inhaler, 2-puffs twice per day.    6. Insomnia:  Now off trazodone, due to association with a lower seizure threshold.    7. History of hepatitis C:  Liver function tests are normal.  We will check a hepatitis C level to confirm treatment completed.          Follow-up in three weeks

## 2018-09-06 NOTE — PROGRESS NOTES
Stuttered speech. No focal neurologic deficits noted.     ASSESSMENT / PLAN:   1. History of stroke: Take aspirin 81 mg and clopidogrel (Plavix) 75 mg every morning. Take atorvastatin (Lipitor) 40 mg every evening. 2. Hypertension (elevated blood pressure) with a low potassium:   Take spironolactone (Aldactone) 25 mg tablet every morning. Take losartan (Diovan) 160 mg every morning. Also continue amlodipine (Norvasc) 10 mg every morning. 3. Diastolic heart failure: This is relatively mild but can result in build up of fluid in the lungs. Will add chlorthalidone and potassium replacement. 4. History of alcohol intake: Advised to avoid further alcohol. Continue taking thiamine (vitamin B1) 100 mg every morning. 5. History of asthma:  Use Symbicort inhaler, 2-puffs twice per day. 6. Insomnia:  Now off trazodone, due to association with a lower seizure threshold. 7. History of hepatitis C:  Liver function tests are normal.  We will check a hepatitis C level to confirm treatment completed.

## 2018-09-17 ENCOUNTER — APPOINTMENT (OUTPATIENT)
Dept: GENERAL RADIOLOGY | Age: 63
End: 2018-09-17
Payer: MEDICAID

## 2018-09-17 ENCOUNTER — HOSPITAL ENCOUNTER (EMERGENCY)
Age: 63
Discharge: HOME | End: 2018-09-17
Attending: EMERGENCY MEDICINE
Payer: MEDICAID

## 2018-09-17 VITALS
TEMPERATURE: 98.2 F | OXYGEN SATURATION: 99 % | BODY MASS INDEX: 32.96 KG/M2 | HEART RATE: 75 BPM | WEIGHT: 210 LBS | HEIGHT: 67 IN | SYSTOLIC BLOOD PRESSURE: 148 MMHG | DIASTOLIC BLOOD PRESSURE: 80 MMHG | RESPIRATION RATE: 16 BRPM

## 2018-09-17 DIAGNOSIS — R03.0 ELEVATED BLOOD PRESSURE READING: ICD-10-CM
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
## 2018-09-17 DIAGNOSIS — L03.113 CELLULITIS OF RIGHT HAND: ICD-10-CM

## 2018-09-17 PROCEDURE — 6370000000 HC RX 637 (ALT 250 FOR IP): Performed by: EMERGENCY MEDICINE

## 2018-09-17 PROCEDURE — 6360000002 HC RX W HCPCS: Performed by: EMERGENCY MEDICINE

## 2018-09-17 PROCEDURE — 99283 EMERGENCY DEPT VISIT LOW MDM: CPT

## 2018-09-17 PROCEDURE — 73130 X-RAY EXAM OF HAND: CPT

## 2018-09-17 PROCEDURE — 90715 TDAP VACCINE 7 YRS/> IM: CPT | Performed by: EMERGENCY MEDICINE

## 2018-09-17 PROCEDURE — 90471 IMMUNIZATION ADMIN: CPT | Performed by: EMERGENCY MEDICINE

## 2018-09-17 PROCEDURE — 96374 THER/PROPH/DIAG INJ IV PUSH: CPT

## 2018-09-17 RX ORDER — CEFAZOLIN SODIUM 1 G/3ML
1 INJECTION, POWDER, FOR SOLUTION INTRAMUSCULAR; INTRAVENOUS ONCE
Status: COMPLETED | OUTPATIENT
Start: 2018-09-17 | End: 2018-09-17

## 2018-09-17 RX ORDER — HYDROCODONE BITARTRATE AND ACETAMINOPHEN 5; 325 MG/1; MG/1
1 TABLET ORAL ONCE
Status: COMPLETED | OUTPATIENT
Start: 2018-09-17 | End: 2018-09-17

## 2018-09-17 RX ORDER — AMOXICILLIN AND CLAVULANATE POTASSIUM 875; 125 MG/1; MG/1
1 TABLET, FILM COATED ORAL 2 TIMES DAILY
Qty: 20 TABLET | Refills: 0 | Status: SHIPPED | OUTPATIENT
Start: 2018-09-17 | End: 2018-09-27

## 2018-09-17 RX ORDER — HYDROCODONE BITARTRATE AND ACETAMINOPHEN 5; 325 MG/1; MG/1
1 TABLET ORAL EVERY 6 HOURS PRN
Qty: 12 TABLET | Refills: 0 | Status: SHIPPED | OUTPATIENT
Start: 2018-09-17 | End: 2018-09-20

## 2018-09-17 RX ADMIN — HYDROCODONE BITARTRATE AND ACETAMINOPHEN 1 TABLET: 5; 325 TABLET ORAL at 14:58

## 2018-09-17 RX ADMIN — TETANUS TOXOID, REDUCED DIPHTHERIA TOXOID AND ACELLULAR PERTUSSIS VACCINE, ADSORBED 0.5 ML: 5; 2.5; 8; 8; 2.5 SUSPENSION INTRAMUSCULAR at 14:15

## 2018-09-17 RX ADMIN — CEFAZOLIN 1 G: 1 INJECTION, POWDER, FOR SOLUTION INTRAMUSCULAR; INTRAVENOUS at 14:15

## 2018-09-17 ASSESSMENT — PAIN DESCRIPTION - PAIN TYPE: TYPE: ACUTE PAIN

## 2018-09-17 ASSESSMENT — PAIN SCALES - GENERAL
PAINLEVEL_OUTOF10: 8
PAINLEVEL_OUTOF10: 8
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
## 2018-09-24 ENCOUNTER — OFFICE VISIT (OUTPATIENT)
Dept: ORTHOPEDIC SURGERY | Age: 63
End: 2018-09-24
Payer: MEDICAID

## 2018-09-24 VITALS
SYSTOLIC BLOOD PRESSURE: 154 MMHG | BODY MASS INDEX: 31.71 KG/M2 | HEART RATE: 60 BPM | HEIGHT: 67 IN | WEIGHT: 202 LBS | DIASTOLIC BLOOD PRESSURE: 92 MMHG

## 2018-09-24 DIAGNOSIS — M79.641 RIGHT HAND PAIN: Primary | ICD-10-CM

## 2018-09-24 DIAGNOSIS — T14.8XXA PENETRATING TRAUMA: ICD-10-CM

## 2018-09-24 DIAGNOSIS — M79.5 FOREIGN BODY (FB) IN SOFT TISSUE: ICD-10-CM

## 2018-09-24 PROCEDURE — 99203 OFFICE O/P NEW LOW 30 MIN: CPT | Performed by: ORTHOPAEDIC SURGERY

## 2018-09-24 PROCEDURE — G8417 CALC BMI ABV UP PARAM F/U: HCPCS | Performed by: ORTHOPAEDIC SURGERY

## 2018-09-24 PROCEDURE — 3017F COLORECTAL CA SCREEN DOC REV: CPT | Performed by: ORTHOPAEDIC SURGERY

## 2018-09-24 PROCEDURE — 1036F TOBACCO NON-USER: CPT | Performed by: ORTHOPAEDIC SURGERY

## 2018-09-24 PROCEDURE — G8598 ASA/ANTIPLAT THER USED: HCPCS | Performed by: ORTHOPAEDIC SURGERY

## 2018-09-24 PROCEDURE — G8427 DOCREV CUR MEDS BY ELIG CLIN: HCPCS | Performed by: ORTHOPAEDIC SURGERY
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
## 2018-09-25 ENCOUNTER — TELEPHONE (OUTPATIENT)
Dept: ORTHOPEDIC SURGERY | Age: 63
End: 2018-09-25

## 2018-09-26 NOTE — PROGRESS NOTES
Obstructive Sleep Apnea (MILADY) Screening     Patient:  Dave Lincoln    YOB: 1955      Medical Record #:  [id number]                     Date:  9/26/2018     1. Are you a loud and/or regular snorer? []  Yes       [x] No    2. Have you been observed to gasp or stop breathing during sleep? []  Yes       [x] No    3. Do you feel tired or groggy upon awakening or do you awaken with a headache?           []  Yes       [] No    4. Are you often tired or fatigued during the wake time hours? []  Yes       [x] No    5. Do you fall asleep sitting, reading, watching TV or driving? []  Yes       [] No    6. Do you often have problems with memory or concentration? []  Yes       [] No    **If patient's score is ? 3 they are considered high risk for MILADY. Notify the anesthesiologist of the high risk and document in focus note. Note:  If the patient's BMI is more than 35 kg m¯² , has neck circumference > 40 cm, and/or high blood pressure the risk is greater (© American [employer]nea Association, 2006).

## 2018-09-27 ENCOUNTER — OFFICE VISIT (OUTPATIENT)
Dept: INTERNAL MEDICINE CLINIC | Age: 63
End: 2018-09-27

## 2018-09-27 VITALS
DIASTOLIC BLOOD PRESSURE: 92 MMHG | HEIGHT: 67 IN | BODY MASS INDEX: 33.12 KG/M2 | OXYGEN SATURATION: 98 % | HEART RATE: 55 BPM | SYSTOLIC BLOOD PRESSURE: 156 MMHG | WEIGHT: 211 LBS

## 2018-09-27 DIAGNOSIS — I10 ESSENTIAL HYPERTENSION: Primary | ICD-10-CM

## 2018-09-27 DIAGNOSIS — J45.30 MILD PERSISTENT ASTHMA WITHOUT COMPLICATION: ICD-10-CM

## 2018-09-27 DIAGNOSIS — Z86.73 HISTORY OF STROKE: ICD-10-CM

## 2018-09-27 DIAGNOSIS — G40.909 SEIZURE DISORDER (HCC): ICD-10-CM

## 2018-09-27 DIAGNOSIS — R55 SYNCOPE AND COLLAPSE: ICD-10-CM

## 2018-09-27 RX ORDER — LEVETIRACETAM 500 MG/1
500 TABLET ORAL 2 TIMES DAILY
Qty: 60 TABLET | Refills: 5 | Status: SHIPPED | OUTPATIENT
Start: 2018-09-27 | End: 2019-02-21 | Stop reason: SDUPTHER
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
## 2018-10-02 NOTE — OP NOTE
pathology. No further pathologic material or other abnormalities were seen. The tourniquet was released and hemostasis achieved with bipolar electrocautery. The wound was irrigated with sterile saline. Skin was closed loosely with 4-0 nylon suture. A soft, bulky dressing and splint was applied and the patient transported to the recovery area in stable condition with good perfusion to all fingertips. ADDENDUM: It should be noted that Intraoperative xray 3 views right hand with AP, Lateral and oblique views were used to identify and confirm removal of the foreign body.       Sonido Seymour 134

## 2018-10-03 ENCOUNTER — ANESTHESIA EVENT (OUTPATIENT)
Dept: OPERATING ROOM | Age: 63
End: 2018-10-03
Payer: MEDICAID

## 2018-10-03 ENCOUNTER — HOSPITAL ENCOUNTER (OUTPATIENT)
Age: 63
Setting detail: OUTPATIENT SURGERY
Discharge: HOME | End: 2018-10-03
Attending: ORTHOPAEDIC SURGERY | Admitting: ORTHOPAEDIC SURGERY
Payer: MEDICAID

## 2018-10-03 ENCOUNTER — ANESTHESIA (OUTPATIENT)
Dept: OPERATING ROOM | Age: 63
End: 2018-10-03
Payer: MEDICAID

## 2018-10-03 VITALS
BODY MASS INDEX: 31.71 KG/M2 | WEIGHT: 202 LBS | OXYGEN SATURATION: 95 % | SYSTOLIC BLOOD PRESSURE: 167 MMHG | HEIGHT: 67 IN | RESPIRATION RATE: 15 BRPM | DIASTOLIC BLOOD PRESSURE: 70 MMHG | HEART RATE: 83 BPM | TEMPERATURE: 97 F

## 2018-10-03 VITALS
TEMPERATURE: 98.6 F | RESPIRATION RATE: 2 BRPM | DIASTOLIC BLOOD PRESSURE: 58 MMHG | OXYGEN SATURATION: 96 % | SYSTOLIC BLOOD PRESSURE: 110 MMHG

## 2018-10-03 DIAGNOSIS — M79.5 FOREIGN BODY (FB) IN SOFT TISSUE: Primary | ICD-10-CM

## 2018-10-03 DIAGNOSIS — M79.5 RETAINED FOREIGN BODY OF HAND: ICD-10-CM

## 2018-10-03 PROCEDURE — 7100000001 HC PACU RECOVERY - ADDTL 15 MIN: Performed by: ORTHOPAEDIC SURGERY

## 2018-10-03 PROCEDURE — 6360000002 HC RX W HCPCS: Performed by: ANESTHESIOLOGY

## 2018-10-03 PROCEDURE — 2580000003 HC RX 258: Performed by: ANESTHESIOLOGY

## 2018-10-03 PROCEDURE — 2500000003 HC RX 250 WO HCPCS: Performed by: NURSE ANESTHETIST, CERTIFIED REGISTERED

## 2018-10-03 PROCEDURE — 2709999900 HC NON-CHARGEABLE SUPPLY: Performed by: ORTHOPAEDIC SURGERY

## 2018-10-03 PROCEDURE — 3700000000 HC ANESTHESIA ATTENDED CARE: Performed by: ORTHOPAEDIC SURGERY

## 2018-10-03 PROCEDURE — 6360000002 HC RX W HCPCS: Performed by: NURSE ANESTHETIST, CERTIFIED REGISTERED

## 2018-10-03 PROCEDURE — 3600000004 HC SURGERY LEVEL 4 BASE: Performed by: ORTHOPAEDIC SURGERY

## 2018-10-03 PROCEDURE — 6370000000 HC RX 637 (ALT 250 FOR IP): Performed by: ANESTHESIOLOGY

## 2018-10-03 PROCEDURE — 7100000010 HC PHASE II RECOVERY - FIRST 15 MIN: Performed by: ORTHOPAEDIC SURGERY

## 2018-10-03 PROCEDURE — 7100000011 HC PHASE II RECOVERY - ADDTL 15 MIN: Performed by: ORTHOPAEDIC SURGERY

## 2018-10-03 PROCEDURE — 6360000002 HC RX W HCPCS: Performed by: ORTHOPAEDIC SURGERY

## 2018-10-03 PROCEDURE — 3700000001 HC ADD 15 MINUTES (ANESTHESIA): Performed by: ORTHOPAEDIC SURGERY

## 2018-10-03 PROCEDURE — 3600000014 HC SURGERY LEVEL 4 ADDTL 15MIN: Performed by: ORTHOPAEDIC SURGERY

## 2018-10-03 PROCEDURE — 2500000003 HC RX 250 WO HCPCS: Performed by: ORTHOPAEDIC SURGERY

## 2018-10-03 PROCEDURE — 2580000003 HC RX 258: Performed by: ORTHOPAEDIC SURGERY

## 2018-10-03 PROCEDURE — 7100000000 HC PACU RECOVERY - FIRST 15 MIN: Performed by: ORTHOPAEDIC SURGERY

## 2018-10-03 PROCEDURE — 88300 SURGICAL PATH GROSS: CPT

## 2018-10-03 RX ORDER — PROPOFOL 10 MG/ML
INJECTION, EMULSION INTRAVENOUS PRN
Status: DISCONTINUED | OUTPATIENT
Start: 2018-10-03 | End: 2018-10-03 | Stop reason: SDUPTHER

## 2018-10-03 RX ORDER — FENTANYL CITRATE 50 UG/ML
INJECTION, SOLUTION INTRAMUSCULAR; INTRAVENOUS PRN
Status: DISCONTINUED | OUTPATIENT
Start: 2018-10-03 | End: 2018-10-03 | Stop reason: SDUPTHER

## 2018-10-03 RX ORDER — BUPIVACAINE HYDROCHLORIDE 5 MG/ML
INJECTION, SOLUTION EPIDURAL; INTRACAUDAL PRN
Status: DISCONTINUED | OUTPATIENT
Start: 2018-10-03 | End: 2018-10-03 | Stop reason: HOSPADM

## 2018-10-03 RX ORDER — MORPHINE SULFATE 2 MG/ML
1 INJECTION, SOLUTION INTRAMUSCULAR; INTRAVENOUS EVERY 5 MIN PRN
Status: DISCONTINUED | OUTPATIENT
Start: 2018-10-03 | End: 2018-10-03 | Stop reason: HOSPADM

## 2018-10-03 RX ORDER — LABETALOL HYDROCHLORIDE 5 MG/ML
5 INJECTION, SOLUTION INTRAVENOUS EVERY 10 MIN PRN
Status: DISCONTINUED | OUTPATIENT
Start: 2018-10-03 | End: 2018-10-03 | Stop reason: HOSPADM

## 2018-10-03 RX ORDER — MEPERIDINE HYDROCHLORIDE 50 MG/ML
12.5 INJECTION INTRAMUSCULAR; INTRAVENOUS; SUBCUTANEOUS EVERY 5 MIN PRN
Status: DISCONTINUED | OUTPATIENT
Start: 2018-10-03 | End: 2018-10-03 | Stop reason: HOSPADM

## 2018-10-03 RX ORDER — LIDOCAINE HYDROCHLORIDE 10 MG/ML
0.3 INJECTION, SOLUTION EPIDURAL; INFILTRATION; INTRACAUDAL; PERINEURAL
Status: DISCONTINUED | OUTPATIENT
Start: 2018-10-03 | End: 2018-10-03 | Stop reason: HOSPADM

## 2018-10-03 RX ORDER — ONDANSETRON 2 MG/ML
4 INJECTION INTRAMUSCULAR; INTRAVENOUS PRN
Status: DISCONTINUED | OUTPATIENT
Start: 2018-10-03 | End: 2018-10-03 | Stop reason: HOSPADM

## 2018-10-03 RX ORDER — HYDROCODONE BITARTRATE AND ACETAMINOPHEN 5; 325 MG/1; MG/1
1 TABLET ORAL EVERY 6 HOURS PRN
Qty: 10 TABLET | Refills: 0 | Status: SHIPPED | OUTPATIENT
Start: 2018-10-03 | End: 2018-10-10

## 2018-10-03 RX ORDER — CEPHALEXIN 500 MG/1
500 CAPSULE ORAL 4 TIMES DAILY
Qty: 28 CAPSULE | Refills: 0 | Status: SHIPPED | OUTPATIENT
Start: 2018-10-03 | End: 2018-10-10

## 2018-10-03 RX ORDER — DEXAMETHASONE SODIUM PHOSPHATE 4 MG/ML
INJECTION, SOLUTION INTRA-ARTICULAR; INTRALESIONAL; INTRAMUSCULAR; INTRAVENOUS; SOFT TISSUE PRN
Status: DISCONTINUED | OUTPATIENT
Start: 2018-10-03 | End: 2018-10-03 | Stop reason: SDUPTHER

## 2018-10-03 RX ORDER — DIPHENHYDRAMINE HYDROCHLORIDE 50 MG/ML
12.5 INJECTION INTRAMUSCULAR; INTRAVENOUS
Status: DISCONTINUED | OUTPATIENT
Start: 2018-10-03 | End: 2018-10-03 | Stop reason: HOSPADM

## 2018-10-03 RX ORDER — OXYCODONE HYDROCHLORIDE AND ACETAMINOPHEN 5; 325 MG/1; MG/1
2 TABLET ORAL PRN
Status: COMPLETED | OUTPATIENT
Start: 2018-10-03 | End: 2018-10-03

## 2018-10-03 RX ORDER — MAGNESIUM HYDROXIDE 1200 MG/15ML
LIQUID ORAL CONTINUOUS PRN
Status: DISCONTINUED | OUTPATIENT
Start: 2018-10-03 | End: 2018-10-03 | Stop reason: HOSPADM

## 2018-10-03 RX ORDER — SODIUM CHLORIDE 0.9 % (FLUSH) 0.9 %
10 SYRINGE (ML) INJECTION EVERY 12 HOURS SCHEDULED
Status: DISCONTINUED | OUTPATIENT
Start: 2018-10-03 | End: 2018-10-03 | Stop reason: HOSPADM

## 2018-10-03 RX ORDER — HYDRALAZINE HYDROCHLORIDE 20 MG/ML
5 INJECTION INTRAMUSCULAR; INTRAVENOUS EVERY 10 MIN PRN
Status: DISCONTINUED | OUTPATIENT
Start: 2018-10-03 | End: 2018-10-03 | Stop reason: HOSPADM

## 2018-10-03 RX ORDER — LIDOCAINE HYDROCHLORIDE 20 MG/ML
INJECTION, SOLUTION INFILTRATION; PERINEURAL PRN
Status: DISCONTINUED | OUTPATIENT
Start: 2018-10-03 | End: 2018-10-03 | Stop reason: SDUPTHER

## 2018-10-03 RX ORDER — GLYCOPYRROLATE 0.2 MG/ML
INJECTION INTRAMUSCULAR; INTRAVENOUS PRN
Status: DISCONTINUED | OUTPATIENT
Start: 2018-10-03 | End: 2018-10-03 | Stop reason: SDUPTHER

## 2018-10-03 RX ORDER — PROMETHAZINE HYDROCHLORIDE 25 MG/ML
6.25 INJECTION, SOLUTION INTRAMUSCULAR; INTRAVENOUS
Status: DISCONTINUED | OUTPATIENT
Start: 2018-10-03 | End: 2018-10-03 | Stop reason: HOSPADM

## 2018-10-03 RX ORDER — IBUPROFEN 600 MG/1
600 TABLET ORAL EVERY 6 HOURS PRN
Qty: 60 TABLET | Refills: 1 | Status: SHIPPED | OUTPATIENT
Start: 2018-10-03 | End: 2020-01-08 | Stop reason: ALTCHOICE

## 2018-10-03 RX ORDER — SODIUM CHLORIDE, SODIUM LACTATE, POTASSIUM CHLORIDE, CALCIUM CHLORIDE 600; 310; 30; 20 MG/100ML; MG/100ML; MG/100ML; MG/100ML
INJECTION, SOLUTION INTRAVENOUS CONTINUOUS
Status: DISCONTINUED | OUTPATIENT
Start: 2018-10-03 | End: 2018-10-03 | Stop reason: HOSPADM

## 2018-10-03 RX ORDER — HYDRALAZINE HYDROCHLORIDE 20 MG/ML
10 INJECTION INTRAMUSCULAR; INTRAVENOUS ONCE
Status: COMPLETED | OUTPATIENT
Start: 2018-10-03 | End: 2018-10-03

## 2018-10-03 RX ORDER — MORPHINE SULFATE 2 MG/ML
2 INJECTION, SOLUTION INTRAMUSCULAR; INTRAVENOUS EVERY 5 MIN PRN
Status: DISCONTINUED | OUTPATIENT
Start: 2018-10-03 | End: 2018-10-03 | Stop reason: HOSPADM

## 2018-10-03 RX ORDER — OXYCODONE HYDROCHLORIDE AND ACETAMINOPHEN 5; 325 MG/1; MG/1
1 TABLET ORAL PRN
Status: COMPLETED | OUTPATIENT
Start: 2018-10-03 | End: 2018-10-03

## 2018-10-03 RX ORDER — SODIUM CHLORIDE 0.9 % (FLUSH) 0.9 %
10 SYRINGE (ML) INJECTION PRN
Status: DISCONTINUED | OUTPATIENT
Start: 2018-10-03 | End: 2018-10-03 | Stop reason: HOSPADM

## 2018-10-03 RX ORDER — ONDANSETRON 2 MG/ML
INJECTION INTRAMUSCULAR; INTRAVENOUS PRN
Status: DISCONTINUED | OUTPATIENT
Start: 2018-10-03 | End: 2018-10-03 | Stop reason: SDUPTHER

## 2018-10-03 RX ADMIN — HYDRALAZINE HYDROCHLORIDE 5 MG: 20 INJECTION INTRAMUSCULAR; INTRAVENOUS at 11:09

## 2018-10-03 RX ADMIN — FENTANYL CITRATE 25 MCG: 50 INJECTION INTRAMUSCULAR; INTRAVENOUS at 09:43

## 2018-10-03 RX ADMIN — PROPOFOL 200 MG: 10 INJECTION, EMULSION INTRAVENOUS at 09:29

## 2018-10-03 RX ADMIN — HYDRALAZINE HYDROCHLORIDE 10 MG: 20 INJECTION INTRAMUSCULAR; INTRAVENOUS at 11:33

## 2018-10-03 RX ADMIN — OXYCODONE HYDROCHLORIDE AND ACETAMINOPHEN 2 TABLET: 5; 325 TABLET ORAL at 10:40

## 2018-10-03 RX ADMIN — HYDROMORPHONE HYDROCHLORIDE 0.5 MG: 1 INJECTION, SOLUTION INTRAMUSCULAR; INTRAVENOUS; SUBCUTANEOUS at 10:46

## 2018-10-03 RX ADMIN — GLYCOPYRROLATE 0.1 MG: 0.2 INJECTION, SOLUTION INTRAMUSCULAR; INTRAVENOUS at 09:53

## 2018-10-03 RX ADMIN — Medication 2 G: at 09:28

## 2018-10-03 RX ADMIN — DEXAMETHASONE SODIUM PHOSPHATE 10 MG: 4 INJECTION, SOLUTION INTRAMUSCULAR; INTRAVENOUS at 09:33

## 2018-10-03 RX ADMIN — LIDOCAINE HYDROCHLORIDE 80 MG: 20 INJECTION, SOLUTION INFILTRATION; PERINEURAL at 09:27

## 2018-10-03 RX ADMIN — SODIUM CHLORIDE, POTASSIUM CHLORIDE, SODIUM LACTATE AND CALCIUM CHLORIDE: 600; 310; 30; 20 INJECTION, SOLUTION INTRAVENOUS at 08:33

## 2018-10-03 RX ADMIN — ONDANSETRON 4 MG: 2 INJECTION INTRAMUSCULAR; INTRAVENOUS at 10:00

## 2018-10-03 RX ADMIN — HYDROMORPHONE HYDROCHLORIDE 0.5 MG: 1 INJECTION, SOLUTION INTRAMUSCULAR; INTRAVENOUS; SUBCUTANEOUS at 10:33

## 2018-10-03 RX ADMIN — FENTANYL CITRATE 25 MCG: 50 INJECTION INTRAMUSCULAR; INTRAVENOUS at 09:42

## 2018-10-03 RX ADMIN — FENTANYL CITRATE 25 MCG: 50 INJECTION INTRAMUSCULAR; INTRAVENOUS at 09:37

## 2018-10-03 RX ADMIN — HYDROMORPHONE HYDROCHLORIDE 0.5 MG: 1 INJECTION, SOLUTION INTRAMUSCULAR; INTRAVENOUS; SUBCUTANEOUS at 10:39

## 2018-10-03 RX ADMIN — FENTANYL CITRATE 50 MCG: 50 INJECTION INTRAMUSCULAR; INTRAVENOUS at 09:27

## 2018-10-03 RX ADMIN — ONDANSETRON 4 MG: 2 INJECTION INTRAMUSCULAR; INTRAVENOUS at 09:34

## 2018-10-03 RX ADMIN — FENTANYL CITRATE 25 MCG: 50 INJECTION INTRAMUSCULAR; INTRAVENOUS at 09:38

## 2018-10-03 RX ADMIN — SODIUM CHLORIDE, POTASSIUM CHLORIDE, SODIUM LACTATE AND CALCIUM CHLORIDE: 600; 310; 30; 20 INJECTION, SOLUTION INTRAVENOUS at 11:07

## 2018-10-03 RX ADMIN — HYDRALAZINE HYDROCHLORIDE 5 MG: 20 INJECTION INTRAMUSCULAR; INTRAVENOUS at 11:19

## 2018-10-03 RX ADMIN — HYDROMORPHONE HYDROCHLORIDE 0.5 MG: 1 INJECTION, SOLUTION INTRAMUSCULAR; INTRAVENOUS; SUBCUTANEOUS at 10:28

## 2018-10-03 RX ADMIN — SODIUM CHLORIDE, POTASSIUM CHLORIDE, SODIUM LACTATE AND CALCIUM CHLORIDE: 600; 310; 30; 20 INJECTION, SOLUTION INTRAVENOUS at 09:25

## 2018-10-03 ASSESSMENT — PULMONARY FUNCTION TESTS
PIF_VALUE: 3
PIF_VALUE: 2
PIF_VALUE: 7
PIF_VALUE: 3
PIF_VALUE: 3
PIF_VALUE: 1
PIF_VALUE: 3
PIF_VALUE: 8
PIF_VALUE: 3
PIF_VALUE: 3
PIF_VALUE: 1
PIF_VALUE: 4
PIF_VALUE: 3
PIF_VALUE: 2
PIF_VALUE: 8
PIF_VALUE: 9
PIF_VALUE: 5
PIF_VALUE: 2
PIF_VALUE: 3
PIF_VALUE: 2
PIF_VALUE: 3
PIF_VALUE: 1
PIF_VALUE: 18
PIF_VALUE: 0
PIF_VALUE: 2
PIF_VALUE: 0
PIF_VALUE: 8
PIF_VALUE: 8
PIF_VALUE: 1
PIF_VALUE: 4
PIF_VALUE: 5
PIF_VALUE: 0
PIF_VALUE: 10
PIF_VALUE: 3
PIF_VALUE: 9
PIF_VALUE: 3
PIF_VALUE: 2
PIF_VALUE: 2
PIF_VALUE: 3
PIF_VALUE: 8
PIF_VALUE: 2
PIF_VALUE: 3
PIF_VALUE: 2

## 2018-10-03 ASSESSMENT — PAIN SCALES - GENERAL
PAINLEVEL_OUTOF10: 5
PAINLEVEL_OUTOF10: 0
PAINLEVEL_OUTOF10: 10
PAINLEVEL_OUTOF10: 10
PAINLEVEL_OUTOF10: 8
PAINLEVEL_OUTOF10: 10
PAINLEVEL_OUTOF10: 8
PAINLEVEL_OUTOF10: 10
PAINLEVEL_OUTOF10: 0
PAINLEVEL_OUTOF10: 10
PAINLEVEL_OUTOF10: 6
PAINLEVEL_OUTOF10: 8
PAINLEVEL_OUTOF10: 8
PAINLEVEL_OUTOF10: 10
PAINLEVEL_OUTOF10: 9
PAINLEVEL_OUTOF10: 8
PAINLEVEL_OUTOF10: 8
PAINLEVEL_OUTOF10: 6

## 2018-10-12 ENCOUNTER — TELEPHONE (OUTPATIENT)
Dept: ORTHOPEDIC SURGERY | Age: 63
End: 2018-10-12

## 2018-10-12 NOTE — TELEPHONE ENCOUNTER
HOME HEALTH NURSE CALLED TO LET US KNOW THAT CAMILO HAS NOT KEPT HIS BANDAGE DRY. HE SAID IT HAS BLED. HE IS CURRENTLY TAKING ANTIBIOTICS. A NEW BANDAGE WAS PLACED AND HE WAS INSTRUCTED TO KEEP IT DRY. HE SEES US Monday FOR A POST OP APPOINTMENT.

## 2018-10-15 ENCOUNTER — OFFICE VISIT (OUTPATIENT)
Dept: ORTHOPEDIC SURGERY | Age: 63
End: 2018-10-15

## 2018-10-15 DIAGNOSIS — T14.8XXA PENETRATING TRAUMA: Primary | ICD-10-CM

## 2018-10-15 PROCEDURE — 99024 POSTOP FOLLOW-UP VISIT: CPT | Performed by: ORTHOPAEDIC SURGERY

## 2018-10-15 RX ORDER — CEPHALEXIN 500 MG/1
500 CAPSULE ORAL 4 TIMES DAILY
Qty: 28 CAPSULE | Refills: 0 | Status: SHIPPED | OUTPATIENT
Start: 2018-10-15 | End: 2018-10-22

## 2018-10-15 NOTE — PROGRESS NOTES
Chief Complaint:  Post-Op Check (PO CK RIGHT HAND FOREIGN BODY REMOVAL 9/26/18)      History of Present of Illness: The patient returns and has been doing overall fairly well after removal of the deep foreign body to his right hand. I did share with the patient that this was embedded very deeply adjacent to the long and ring finger spaces and there was some foreign material that was very carefully lavaged after surgery. Review of Systems  Pertinent items are noted in HPI  Denies fever, chills, confusion, bowel/bladder active change. Examination:  On exam today the incision is healing nicely and there is no active drainage. He does have some tenderness but there is no warmth. He demonstrates almost a full range of motion of the fingers. There may be a tiny mild separation at the incision site at the mid incision level. Radiology:     X-rays obtained and reviewed in office:  Views    Location    Impression      No orders of the defined types were placed in this encounter. Impression:  Encounter Diagnosis   Name Primary?  Penetrating trauma Yes         Treatment Plan: Today we will go ahead and remove the sutures and apply Steri-Strips. As a precaution I've gone ahead and placed him on an antibiotic which has been sent to his pharmacy. He will also start some warm soapy soaks twice daily to the hand. As long as he continues to make positive improving progress I will see him back on an as-needed basis. Please note that this transcription was created using voice recognition software. Any errors are unintentional and may be due to voice recognition transcription.

## 2018-10-25 ENCOUNTER — OFFICE VISIT (OUTPATIENT)
Dept: INTERNAL MEDICINE CLINIC | Age: 63
End: 2018-10-25

## 2018-10-25 VITALS
HEART RATE: 84 BPM | SYSTOLIC BLOOD PRESSURE: 116 MMHG | HEIGHT: 67 IN | DIASTOLIC BLOOD PRESSURE: 74 MMHG | OXYGEN SATURATION: 94 % | WEIGHT: 213 LBS | BODY MASS INDEX: 33.43 KG/M2

## 2018-10-25 DIAGNOSIS — J45.30 MILD PERSISTENT ASTHMA WITHOUT COMPLICATION: ICD-10-CM

## 2018-10-25 DIAGNOSIS — Z86.73 HISTORY OF STROKE: ICD-10-CM

## 2018-10-25 DIAGNOSIS — M25.561 PATELLOFEMORAL ARTHRALGIA OF RIGHT KNEE: ICD-10-CM

## 2018-10-25 DIAGNOSIS — I10 ESSENTIAL HYPERTENSION: Primary | ICD-10-CM

## 2018-10-25 DIAGNOSIS — E78.2 MIXED HYPERLIPIDEMIA: ICD-10-CM

## 2018-11-05 ENCOUNTER — OFFICE VISIT (OUTPATIENT)
Dept: ORTHOPEDIC SURGERY | Age: 63
End: 2018-11-05

## 2018-11-05 VITALS — BODY MASS INDEX: 33.43 KG/M2 | WEIGHT: 212.96 LBS | HEIGHT: 67 IN

## 2018-11-05 DIAGNOSIS — M79.5 FOREIGN BODY (FB) IN SOFT TISSUE: Primary | ICD-10-CM

## 2018-11-05 PROCEDURE — 99024 POSTOP FOLLOW-UP VISIT: CPT | Performed by: ORTHOPAEDIC SURGERY

## 2018-11-09 RX ORDER — LOSARTAN POTASSIUM 100 MG/1
100 TABLET ORAL DAILY
Qty: 30 TABLET | Refills: 5 | Status: SHIPPED | OUTPATIENT
Start: 2018-11-09 | End: 2019-04-23 | Stop reason: SDUPTHER

## 2018-11-19 RX ORDER — AMLODIPINE BESYLATE 10 MG/1
TABLET ORAL
Qty: 30 TABLET | Refills: 11 | Status: SHIPPED | OUTPATIENT
Start: 2018-11-19 | End: 2019-11-15 | Stop reason: SDUPTHER

## 2018-12-06 ENCOUNTER — OFFICE VISIT (OUTPATIENT)
Dept: INTERNAL MEDICINE CLINIC | Age: 63
End: 2018-12-06

## 2018-12-06 VITALS
HEIGHT: 67 IN | WEIGHT: 213 LBS | BODY MASS INDEX: 33.43 KG/M2 | SYSTOLIC BLOOD PRESSURE: 130 MMHG | DIASTOLIC BLOOD PRESSURE: 80 MMHG | HEART RATE: 59 BPM | OXYGEN SATURATION: 97 %

## 2018-12-06 DIAGNOSIS — Z86.73 HISTORY OF STROKE: ICD-10-CM

## 2018-12-06 DIAGNOSIS — G40.909 SEIZURE DISORDER (HCC): ICD-10-CM

## 2018-12-06 DIAGNOSIS — Z12.11 SCREENING FOR MALIGNANT NEOPLASM OF COLON: ICD-10-CM

## 2018-12-06 DIAGNOSIS — E78.2 MIXED HYPERLIPIDEMIA: ICD-10-CM

## 2018-12-06 DIAGNOSIS — I10 ESSENTIAL HYPERTENSION: ICD-10-CM

## 2018-12-06 DIAGNOSIS — J45.30 MILD PERSISTENT ASTHMA WITHOUT COMPLICATION: ICD-10-CM

## 2018-12-06 PROCEDURE — 99214 OFFICE O/P EST MOD 30 MIN: CPT | Performed by: INTERNAL MEDICINE

## 2018-12-06 RX ORDER — TADALAFIL 10 MG/1
10 TABLET ORAL PRN
Qty: 20 TABLET | Refills: 1 | Status: SHIPPED | OUTPATIENT
Start: 2018-12-06 | End: 2019-04-25

## 2018-12-06 RX ORDER — UBIDECARENONE 30 MG
CAPSULE ORAL
Refills: 3 | COMMUNITY
Start: 2018-11-20 | End: 2019-10-18 | Stop reason: SDUPTHER

## 2018-12-06 ASSESSMENT — PATIENT HEALTH QUESTIONNAIRE - PHQ9
SUM OF ALL RESPONSES TO PHQ QUESTIONS 1-9: 0
SUM OF ALL RESPONSES TO PHQ9 QUESTIONS 1 & 2: 0
SUM OF ALL RESPONSES TO PHQ QUESTIONS 1-9: 0
2. FEELING DOWN, DEPRESSED OR HOPELESS: 0
1. LITTLE INTEREST OR PLEASURE IN DOING THINGS: 0

## 2019-02-22 RX ORDER — LEVETIRACETAM 500 MG/1
TABLET ORAL
Qty: 60 TABLET | Refills: 10 | Status: SHIPPED | OUTPATIENT
Start: 2019-02-22 | End: 2020-01-13

## 2019-02-27 ENCOUNTER — TELEPHONE (OUTPATIENT)
Dept: PULMONOLOGY | Age: 64
End: 2019-02-27

## 2019-04-25 ENCOUNTER — OFFICE VISIT (OUTPATIENT)
Dept: INTERNAL MEDICINE CLINIC | Age: 64
End: 2019-04-25

## 2019-04-25 VITALS
HEIGHT: 67 IN | OXYGEN SATURATION: 97 % | DIASTOLIC BLOOD PRESSURE: 82 MMHG | WEIGHT: 218 LBS | SYSTOLIC BLOOD PRESSURE: 140 MMHG | HEART RATE: 58 BPM | BODY MASS INDEX: 34.21 KG/M2

## 2019-04-25 DIAGNOSIS — F51.01 PRIMARY INSOMNIA: ICD-10-CM

## 2019-04-25 DIAGNOSIS — E78.2 MIXED HYPERLIPIDEMIA: ICD-10-CM

## 2019-04-25 DIAGNOSIS — G40.909 SEIZURE DISORDER (HCC): ICD-10-CM

## 2019-04-25 DIAGNOSIS — I10 ESSENTIAL HYPERTENSION: ICD-10-CM

## 2019-04-25 DIAGNOSIS — J45.30 MILD PERSISTENT ASTHMA WITHOUT COMPLICATION: ICD-10-CM

## 2019-04-25 DIAGNOSIS — I50.32 CHRONIC DIASTOLIC HEART FAILURE (HCC): ICD-10-CM

## 2019-04-25 DIAGNOSIS — Z86.73 HISTORY OF STROKE: Primary | ICD-10-CM

## 2019-04-25 PROCEDURE — 99215 OFFICE O/P EST HI 40 MIN: CPT | Performed by: INTERNAL MEDICINE

## 2019-04-25 RX ORDER — SPIRONOLACTONE 25 MG/1
TABLET ORAL
Qty: 30 TABLET | Refills: 3 | Status: SHIPPED | OUTPATIENT
Start: 2019-04-25 | End: 2019-07-18 | Stop reason: SDUPTHER

## 2019-04-25 RX ORDER — CITALOPRAM 20 MG/1
20 TABLET ORAL DAILY
Qty: 30 TABLET | Refills: 5 | Status: SHIPPED | OUTPATIENT
Start: 2019-04-25 | End: 2019-09-24 | Stop reason: SDUPTHER

## 2019-04-25 RX ORDER — LOSARTAN POTASSIUM 100 MG/1
100 TABLET ORAL DAILY
Qty: 30 TABLET | Refills: 0 | Status: SHIPPED | OUTPATIENT
Start: 2019-04-25 | End: 2019-05-21 | Stop reason: SDUPTHER

## 2019-04-25 NOTE — PROGRESS NOTES
106 08/22/2018    CO2 24 08/22/2018    BUN 18 08/22/2018    CREATININE 1.3 08/22/2018    GLUCOSE 107 (H) 08/22/2018       OBJECTIVE:    BP (!) 140/82 (Site: Right Upper Arm, Position: Sitting, Cuff Size: Large Adult)   Pulse 58   Ht 5' 7\" (1.702 m)   Wt 218 lb (98.9 kg)   SpO2 97%   BMI 34.14 kg/m²   HEENT:  Oropharynx clear, no lymphadenopathy,   LUNGS:  Clear and without wheezes  HEART:  Regular rhythm, no appreciable murmur  ABD:  Benign, active bowel sounds  EXT:  No edema, pulses palpable  NEURO:  No focal neurologic deficits noted. ASSESSMENT / PLAN:   1. History of stroke:   START back taking aspirin 81 mg and clopidogrel (Plavix) 75 mg every morning.   Take atorvastatin (Lipitor) 40 mg every evening.    2. Hypertension:  Blood pressure borderline today at 140/82.  Continue taking losartan (Diovan) 160 mg every morning and amlodipine (Norvasc) 10 mg every morning. 3. Diastolic heart failure:  This is relatively mild but can result in build up of fluid in the lungs.   May add diuretic in the future. 4. History of alcohol intake:  Advised to avoid further alcohol.  Continue taking thiamine (vitamin B1) 100 mg every morning.    5. History of asthma:  Use albuterol inhale as needed, he did not get Symbicort delivered by [employer]. 6. Insomnia and fatigue:  START taking citalopram (Celexa) 10 mg (half tablet) once per day in the evening for a week, then 20 mg (whole tablet) every evening. 7. Seizure:  Continue taking levetiracetam (Keppra) 500 mg twice per day.        Follow-up in two weeks

## 2019-05-16 ENCOUNTER — OFFICE VISIT (OUTPATIENT)
Dept: INTERNAL MEDICINE CLINIC | Age: 64
End: 2019-05-16

## 2019-05-16 VITALS
DIASTOLIC BLOOD PRESSURE: 78 MMHG | HEART RATE: 84 BPM | SYSTOLIC BLOOD PRESSURE: 140 MMHG | BODY MASS INDEX: 33.74 KG/M2 | HEIGHT: 67 IN | OXYGEN SATURATION: 94 % | WEIGHT: 215 LBS

## 2019-05-16 DIAGNOSIS — Z86.73 HISTORY OF STROKE: ICD-10-CM

## 2019-05-16 DIAGNOSIS — I50.32 CHRONIC DIASTOLIC HEART FAILURE (HCC): ICD-10-CM

## 2019-05-16 DIAGNOSIS — I10 ESSENTIAL HYPERTENSION: Primary | ICD-10-CM

## 2019-05-16 DIAGNOSIS — J45.30 MILD PERSISTENT ASTHMA WITHOUT COMPLICATION: ICD-10-CM

## 2019-05-16 DIAGNOSIS — G40.909 SEIZURE DISORDER (HCC): ICD-10-CM

## 2019-05-16 DIAGNOSIS — N52.9 ERECTILE DYSFUNCTION, UNSPECIFIED ERECTILE DYSFUNCTION TYPE: ICD-10-CM

## 2019-05-16 PROCEDURE — 99215 OFFICE O/P EST HI 40 MIN: CPT | Performed by: INTERNAL MEDICINE

## 2019-05-16 ASSESSMENT — PATIENT HEALTH QUESTIONNAIRE - PHQ9
SUM OF ALL RESPONSES TO PHQ QUESTIONS 1-9: 1
SUM OF ALL RESPONSES TO PHQ QUESTIONS 1-9: 1
1. LITTLE INTEREST OR PLEASURE IN DOING THINGS: 1
2. FEELING DOWN, DEPRESSED OR HOPELESS: 0
SUM OF ALL RESPONSES TO PHQ9 QUESTIONS 1 & 2: 1

## 2019-05-16 NOTE — PROGRESS NOTES
94%   BMI 33.67 kg/m²   HEENT:  Oropharynx clear, no lymphadenopathy,   LUNGS:  Clear and without wheezes  HEART:  Regular rhythm, no appreciable murmur  ABD:  Benign, active bowel sounds  EXT:  No edema, pulses palpable  NEURO:  No focal neurologic deficits noted. ASSESSMENT / PLAN:   1. History of stroke:   Continue taking aspirin 81 mg and clopidogrel (Plavix) 75 mg every morning.     2. Dyslipidemia:  Take atorvastatin (Lipitor) 40 mg every evening.    3. Hypertension:  Blood pressure borderline today at 140/78.  Continue taking losartan (Diovan) 160 mg every morning and amlodipine (Norvasc) 10 mg every morning. 4. Diastolic heart failure:  This is relatively mild but can result in build up of fluid in the lungs.   May add diuretic in the future.   5. History of alcohol intake:  Advised to avoid further alcohol.  Continue taking thiamine (vitamin B1) 100 mg every morning.    6. History of asthma:  Use albuterol inhale as needed, he did not get Symbicort delivered by mail-in pharmacy.   7. Insomnia and fatigue:  START taking citalopram (Celexa) 10 mg (half tablet) once per day in the evening for a week, then 20 mg (whole tablet) every evening. 8. Seizure:  Continue taking levetiracetam (Keppra) 500 mg twice per day. 9. Erectile dysfunction and hematuria (blood in the urine): We will refer to Urology for evaluation (Dr. Rhiannon Teresa).       Follow-up after seen by Urology

## 2019-05-22 RX ORDER — LOSARTAN POTASSIUM 100 MG/1
TABLET ORAL
Qty: 30 TABLET | Refills: 5 | Status: SHIPPED | OUTPATIENT
Start: 2019-05-22 | End: 2019-10-18 | Stop reason: SDUPTHER

## 2019-06-28 ENCOUNTER — HOSPITAL ENCOUNTER (EMERGENCY)
Age: 64
Discharge: HOME | End: 2019-06-28
Payer: MEDICAID

## 2019-06-28 VITALS
HEIGHT: 67 IN | OXYGEN SATURATION: 97 % | SYSTOLIC BLOOD PRESSURE: 129 MMHG | RESPIRATION RATE: 16 BRPM | BODY MASS INDEX: 33.74 KG/M2 | HEART RATE: 64 BPM | WEIGHT: 215 LBS | TEMPERATURE: 97.8 F | DIASTOLIC BLOOD PRESSURE: 74 MMHG

## 2019-06-28 DIAGNOSIS — L03.115 CELLULITIS OF RIGHT LOWER EXTREMITY: Primary | ICD-10-CM

## 2019-06-28 LAB
A/G RATIO: 1.4 (ref 1.1–2.2)
ALBUMIN SERPL-MCNC: 4.3 G/DL (ref 3.4–5)
ALP BLD-CCNC: 74 U/L (ref 40–129)
ALT SERPL-CCNC: 43 U/L (ref 10–40)
ANION GAP SERPL CALCULATED.3IONS-SCNC: 13 MMOL/L (ref 3–16)
AST SERPL-CCNC: 32 U/L (ref 15–37)
BASOPHILS ABSOLUTE: 0.1 K/UL (ref 0–0.2)
BASOPHILS RELATIVE PERCENT: 1.1 %
BILIRUB SERPL-MCNC: 0.5 MG/DL (ref 0–1)
BUN BLDV-MCNC: 19 MG/DL (ref 7–20)
CALCIUM SERPL-MCNC: 9.6 MG/DL (ref 8.3–10.6)
CHLORIDE BLD-SCNC: 102 MMOL/L (ref 99–110)
CO2: 24 MMOL/L (ref 21–32)
CREAT SERPL-MCNC: 0.9 MG/DL (ref 0.8–1.3)
EOSINOPHILS ABSOLUTE: 0.2 K/UL (ref 0–0.6)
EOSINOPHILS RELATIVE PERCENT: 2.7 %
GFR AFRICAN AMERICAN: >60
GFR NON-AFRICAN AMERICAN: >60
GLOBULIN: 3 G/DL
GLUCOSE BLD-MCNC: 138 MG/DL (ref 70–99)
HCT VFR BLD CALC: 43 % (ref 40.5–52.5)
HEMOGLOBIN: 14.9 G/DL (ref 13.5–17.5)
LIPASE: 68 U/L (ref 13–60)
LYMPHOCYTES ABSOLUTE: 0.9 K/UL (ref 1–5.1)
LYMPHOCYTES RELATIVE PERCENT: 14.5 %
MCH RBC QN AUTO: 31.2 PG (ref 26–34)
MCHC RBC AUTO-ENTMCNC: 34.6 G/DL (ref 31–36)
MCV RBC AUTO: 89.9 FL (ref 80–100)
MONOCYTES ABSOLUTE: 0.7 K/UL (ref 0–1.3)
MONOCYTES RELATIVE PERCENT: 11.8 %
NEUTROPHILS ABSOLUTE: 4.3 K/UL (ref 1.7–7.7)
NEUTROPHILS RELATIVE PERCENT: 69.9 %
PDW BLD-RTO: 13.5 % (ref 12.4–15.4)
PLATELET # BLD: 152 K/UL (ref 135–450)
PMV BLD AUTO: 8.8 FL (ref 5–10.5)
POTASSIUM SERPL-SCNC: 4.2 MMOL/L (ref 3.5–5.1)
RBC # BLD: 4.78 M/UL (ref 4.2–5.9)
SODIUM BLD-SCNC: 139 MMOL/L (ref 136–145)
TOTAL PROTEIN: 7.3 G/DL (ref 6.4–8.2)
WBC # BLD: 6.1 K/UL (ref 4–11)

## 2019-06-28 PROCEDURE — 2580000003 HC RX 258: Performed by: PHYSICIAN ASSISTANT

## 2019-06-28 PROCEDURE — 85025 COMPLETE CBC W/AUTO DIFF WBC: CPT

## 2019-06-28 PROCEDURE — 80053 COMPREHEN METABOLIC PANEL: CPT

## 2019-06-28 PROCEDURE — 6370000000 HC RX 637 (ALT 250 FOR IP): Performed by: PHYSICIAN ASSISTANT

## 2019-06-28 PROCEDURE — 93971 EXTREMITY STUDY: CPT

## 2019-06-28 PROCEDURE — 83690 ASSAY OF LIPASE: CPT

## 2019-06-28 PROCEDURE — 99284 EMERGENCY DEPT VISIT MOD MDM: CPT

## 2019-06-28 RX ORDER — CLINDAMYCIN HYDROCHLORIDE 300 MG/1
300 CAPSULE ORAL 4 TIMES DAILY
Qty: 14 CAPSULE | Refills: 0 | Status: SHIPPED | OUTPATIENT
Start: 2019-06-28 | End: 2019-07-08

## 2019-06-28 RX ORDER — 0.9 % SODIUM CHLORIDE 0.9 %
1000 INTRAVENOUS SOLUTION INTRAVENOUS ONCE
Status: COMPLETED | OUTPATIENT
Start: 2019-06-28 | End: 2019-06-28

## 2019-06-28 RX ORDER — CLINDAMYCIN HYDROCHLORIDE 150 MG/1
450 CAPSULE ORAL ONCE
Status: COMPLETED | OUTPATIENT
Start: 2019-06-28 | End: 2019-06-28

## 2019-06-28 RX ADMIN — SODIUM CHLORIDE 1000 ML: 9 INJECTION, SOLUTION INTRAVENOUS at 11:55

## 2019-06-28 RX ADMIN — CLINDAMYCIN HYDROCHLORIDE 450 MG: 150 CAPSULE ORAL at 13:21

## 2019-06-28 ASSESSMENT — PAIN SCALES - GENERAL: PAINLEVEL_OUTOF10: 7

## 2019-06-28 ASSESSMENT — PAIN DESCRIPTION - PAIN TYPE: TYPE: ACUTE PAIN

## 2019-06-29 NOTE — ED PROVIDER NOTES
spontaneous and phasic flow throughout the lower extremity. 3. Incidental finding of multiple enlarged lymph nodes at the groin with the largest measured at 1.5 X 0.7 cm. 4. Incidental finding of a cystic structure at the medial joint space measuring 1.3 X 0.4 cm. Left Impression 1. There is complete compressibility of the common femoral vein. 2. There is normal spontaneous and phasic flow in the common femoral vein. Conclusions   Summary   1. There is no evidence of deep or superficial venous thrombosis in the  right lower extremity or left common femoral vein. 2. Incidental finding of a Baker's cyst at the right medial joint space  measuring 1.3 X 0.4 cm. Signature   ------------------------------------------------------------------  Electronically signed by Marvin Wayne MD (Interpreting  physician) on 06/28/2019 at 04:47 PM  ------------------------------------------------------------------  Patient Status:ER. Study [facility]. Technical Quality:Adequate visualization.   - Results were reported. Risk Factors History +------------------+----------+----------------------------------------------+ ! Diagnosis         ! Date      ! Comments                                      ! +------------------+----------+----------------------------------------------+ ! Previous Procedure!          !7/5/17: Negative RLE                          ! +------------------+----------+----------------------------------------------+ ! Other             !06/28/2019! Right leg pain and swelling for past 2 days   ! !                  !          !with concern for possible lateral anterior    ! !                  !          !calf trauma                                   ! +------------------+----------+----------------------------------------------+ ! Previous Procedure!           !4/30/18: Negative DVT BLE                     ! +------------------+----------+----------------------------------------------+

## 2019-07-11 ENCOUNTER — OFFICE VISIT (OUTPATIENT)
Dept: INTERNAL MEDICINE CLINIC | Age: 64
End: 2019-07-11

## 2019-07-11 VITALS
HEIGHT: 67 IN | SYSTOLIC BLOOD PRESSURE: 130 MMHG | DIASTOLIC BLOOD PRESSURE: 70 MMHG | OXYGEN SATURATION: 96 % | BODY MASS INDEX: 35 KG/M2 | WEIGHT: 223 LBS | HEART RATE: 70 BPM

## 2019-07-11 DIAGNOSIS — F51.01 PRIMARY INSOMNIA: ICD-10-CM

## 2019-07-11 DIAGNOSIS — Z12.11 COLON CANCER SCREENING: Primary | ICD-10-CM

## 2019-07-11 DIAGNOSIS — I50.32 CHRONIC DIASTOLIC HEART FAILURE (HCC): ICD-10-CM

## 2019-07-11 DIAGNOSIS — H01.005 BLEPHARITIS OF LEFT LOWER EYELID, UNSPECIFIED TYPE: ICD-10-CM

## 2019-07-11 DIAGNOSIS — E78.5 DYSLIPIDEMIA: ICD-10-CM

## 2019-07-11 DIAGNOSIS — Z86.73 HISTORY OF STROKE: ICD-10-CM

## 2019-07-11 DIAGNOSIS — J45.30 MILD PERSISTENT ASTHMA WITHOUT COMPLICATION: ICD-10-CM

## 2019-07-11 PROCEDURE — 99215 OFFICE O/P EST HI 40 MIN: CPT | Performed by: INTERNAL MEDICINE

## 2019-07-11 RX ORDER — BACITRACIN 500 [USP'U]/G
OINTMENT OPHTHALMIC 3 TIMES DAILY
Qty: 1 TUBE | Refills: 1 | Status: SHIPPED | OUTPATIENT
Start: 2019-07-11 | End: 2019-07-21

## 2019-07-11 NOTE — PROGRESS NOTES
06/28/2019    BUN 19 06/28/2019    CREATININE 0.9 06/28/2019    GLUCOSE 138 (H) 06/28/2019       OBJECTIVE:    /70   Pulse 70   Ht 5' 7\" (1.702 m)   Wt 223 lb (101.2 kg)   SpO2 96%   BMI 34.93 kg/m²   HEENT:  Oropharynx clear, no lymphadenopathy, left eye blepharitis   LUNGS:  Clear and without wheezes  HEART:  Regular rhythm, no appreciable murmur  ABD:  Benign, active bowel sounds  EXT:  No edema, pulses palpable  NEURO:  No focal neurologic deficits noted. ASSESSMENT / PLAN:   1. History of stroke:   Continue taking aspirin 81 mg and clopidogrel (Plavix) 75 mg every morning.     2. Dyslipidemia:  Take atorvastatin (Lipitor) 40 mg every evening.    3. Hypertension:  Blood pressure borderline today at 130/70.  Continue taking losartan (Diovan) 160 mg every morning and amlodipine (Norvasc) 10 mg every morning. 4. Diastolic heart failure:  This is relatively mild but can result in build up of fluid in the lungs.   May add diuretic in the future.   5. History of alcohol intake:  Advised to avoid further alcohol.  Continue taking thiamine (vitamin B1) 100 mg every morning.    6. History of asthma:  Use albuterol inhale as needed, he did not get Symbicort delivered by mail-in pharmacy.   7. Insomnia and fatigue:  START taking citalopram (Celexa) 10 mg (half tablet) once per day in the evening for a week, then 20 mg (whole tablet) every evening. 8. Seizure:  Continue taking levetiracetam (Keppra) 500 mg twice per day.   9. Erectile dysfunction and hematuria (blood in the urine):   Referral to Urology for evaluation (Dr. Leticia Miranda) pending. 10. Blepharospasm of the eyes:   Place some erythromycin ointment on both lower eyelids at bedtime every night a week.   Check visual acuity with optometry.         Follow-up as needed and with primary care physician  Refer for screeningcolonoscopy

## 2019-07-14 ENCOUNTER — APPOINTMENT (OUTPATIENT)
Dept: GENERAL RADIOLOGY | Age: 64
End: 2019-07-14
Payer: MEDICAID

## 2019-07-14 ENCOUNTER — HOSPITAL ENCOUNTER (EMERGENCY)
Age: 64
Discharge: HOME | End: 2019-07-14
Attending: EMERGENCY MEDICINE
Payer: MEDICAID

## 2019-07-14 ENCOUNTER — APPOINTMENT (OUTPATIENT)
Dept: CT IMAGING | Age: 64
End: 2019-07-14
Payer: MEDICAID

## 2019-07-14 VITALS
HEART RATE: 69 BPM | OXYGEN SATURATION: 96 % | SYSTOLIC BLOOD PRESSURE: 147 MMHG | WEIGHT: 225 LBS | HEIGHT: 67 IN | BODY MASS INDEX: 35.31 KG/M2 | DIASTOLIC BLOOD PRESSURE: 88 MMHG | TEMPERATURE: 98.2 F | RESPIRATION RATE: 16 BRPM

## 2019-07-14 DIAGNOSIS — M25.512 ACUTE PAIN OF LEFT SHOULDER: ICD-10-CM

## 2019-07-14 DIAGNOSIS — S09.90XA INJURY OF HEAD, INITIAL ENCOUNTER: ICD-10-CM
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
## 2019-07-14 DIAGNOSIS — M54.2 NECK PAIN: ICD-10-CM

## 2019-07-14 DIAGNOSIS — R56.9 SEIZURE (HCC): ICD-10-CM

## 2019-07-14 DIAGNOSIS — M25.552 LEFT HIP PAIN: ICD-10-CM

## 2019-07-14 LAB
A/G RATIO: 1.5 (ref 1.1–2.2)
ALBUMIN SERPL-MCNC: 4.7 G/DL (ref 3.4–5)
ALP BLD-CCNC: 74 U/L (ref 40–129)
ALT SERPL-CCNC: 48 U/L (ref 10–40)
ANION GAP SERPL CALCULATED.3IONS-SCNC: 12 MMOL/L (ref 3–16)
AST SERPL-CCNC: 38 U/L (ref 15–37)
BASOPHILS ABSOLUTE: 0.1 K/UL (ref 0–0.2)
BASOPHILS RELATIVE PERCENT: 1.2 %
BILIRUB SERPL-MCNC: 0.5 MG/DL (ref 0–1)
BILIRUBIN URINE: NEGATIVE
BLOOD, URINE: NEGATIVE
BUN BLDV-MCNC: 19 MG/DL (ref 7–20)
CALCIUM SERPL-MCNC: 9.3 MG/DL (ref 8.3–10.6)
CHLORIDE BLD-SCNC: 104 MMOL/L (ref 99–110)
CLARITY: CLEAR
CO2: 22 MMOL/L (ref 21–32)
COLOR: YELLOW
CREAT SERPL-MCNC: 1 MG/DL (ref 0.8–1.3)
EKG ATRIAL RATE: 71 BPM
EKG DIAGNOSIS: NORMAL
EKG P AXIS: 57 DEGREES
EKG P-R INTERVAL: 126 MS
EKG Q-T INTERVAL: 432 MS
EKG QRS DURATION: 158 MS
EKG QTC CALCULATION (BAZETT): 469 MS
EKG R AXIS: 103 DEGREES
EKG T AXIS: 48 DEGREES
EKG VENTRICULAR RATE: 71 BPM
EOSINOPHILS ABSOLUTE: 0.2 K/UL (ref 0–0.6)
EOSINOPHILS RELATIVE PERCENT: 2.8 %
ETHANOL: NORMAL MG/DL (ref 0–0.08)
GFR AFRICAN AMERICAN: >60
GFR NON-AFRICAN AMERICAN: >60
GLOBULIN: 3.2 G/DL
GLUCOSE BLD-MCNC: 99 MG/DL (ref 70–99)
GLUCOSE URINE: NEGATIVE MG/DL
HCT VFR BLD CALC: 46 % (ref 40.5–52.5)
HEMOGLOBIN: 15.8 G/DL (ref 13.5–17.5)
KETONES, URINE: NEGATIVE MG/DL
LEUKOCYTE ESTERASE, URINE: NEGATIVE
LYMPHOCYTES ABSOLUTE: 1 K/UL (ref 1–5.1)
LYMPHOCYTES RELATIVE PERCENT: 17.5 %
MCH RBC QN AUTO: 31.1 PG (ref 26–34)
MCHC RBC AUTO-ENTMCNC: 34.3 G/DL (ref 31–36)
MCV RBC AUTO: 90.6 FL (ref 80–100)
MICROSCOPIC EXAMINATION: NORMAL
MONOCYTES ABSOLUTE: 0.6 K/UL (ref 0–1.3)
MONOCYTES RELATIVE PERCENT: 10.6 %
NEUTROPHILS ABSOLUTE: 4 K/UL (ref 1.7–7.7)
NEUTROPHILS RELATIVE PERCENT: 67.9 %
NITRITE, URINE: NEGATIVE
PDW BLD-RTO: 13.9 % (ref 12.4–15.4)
PH UA: 6 (ref 5–8)
PLATELET # BLD: 171 K/UL (ref 135–450)
PMV BLD AUTO: 9.4 FL (ref 5–10.5)
POTASSIUM SERPL-SCNC: 4.6 MMOL/L (ref 3.5–5.1)
PROTEIN UA: NEGATIVE MG/DL
RBC # BLD: 5.07 M/UL (ref 4.2–5.9)
SODIUM BLD-SCNC: 138 MMOL/L (ref 136–145)
SPECIFIC GRAVITY UA: 1.02 (ref 1–1.03)
TOTAL PROTEIN: 7.9 G/DL (ref 6.4–8.2)
URINE TYPE: NORMAL
UROBILINOGEN, URINE: 0.2 E.U./DL
WBC # BLD: 5.9 K/UL (ref 4–11)

## 2019-07-14 PROCEDURE — 80053 COMPREHEN METABOLIC PANEL: CPT

## 2019-07-14 PROCEDURE — G0480 DRUG TEST DEF 1-7 CLASSES: HCPCS

## 2019-07-14 PROCEDURE — 96361 HYDRATE IV INFUSION ADD-ON: CPT

## 2019-07-14 PROCEDURE — 72125 CT NECK SPINE W/O DYE: CPT

## 2019-07-14 PROCEDURE — 85025 COMPLETE CBC W/AUTO DIFF WBC: CPT

## 2019-07-14 PROCEDURE — 73030 X-RAY EXAM OF SHOULDER: CPT

## 2019-07-14 PROCEDURE — 72131 CT LUMBAR SPINE W/O DYE: CPT

## 2019-07-14 PROCEDURE — 81003 URINALYSIS AUTO W/O SCOPE: CPT

## 2019-07-14 PROCEDURE — 96360 HYDRATION IV INFUSION INIT: CPT

## 2019-07-14 PROCEDURE — 2580000003 HC RX 258: Performed by: NURSE PRACTITIONER

## 2019-07-14 PROCEDURE — 93005 ELECTROCARDIOGRAM TRACING: CPT | Performed by: NURSE PRACTITIONER

## 2019-07-14 PROCEDURE — 93010 ELECTROCARDIOGRAM REPORT: CPT | Performed by: INTERNAL MEDICINE

## 2019-07-14 PROCEDURE — 6370000000 HC RX 637 (ALT 250 FOR IP): Performed by: NURSE PRACTITIONER

## 2019-07-14 PROCEDURE — 73502 X-RAY EXAM HIP UNI 2-3 VIEWS: CPT

## 2019-07-14 PROCEDURE — 99284 EMERGENCY DEPT VISIT MOD MDM: CPT

## 2019-07-14 PROCEDURE — 70450 CT HEAD/BRAIN W/O DYE: CPT

## 2019-07-14 RX ORDER — 0.9 % SODIUM CHLORIDE 0.9 %
1000 INTRAVENOUS SOLUTION INTRAVENOUS ONCE
Status: COMPLETED | OUTPATIENT
Start: 2019-07-14 | End: 2019-07-14

## 2019-07-14 RX ORDER — ACETAMINOPHEN 325 MG/1
650 TABLET ORAL ONCE
Status: COMPLETED | OUTPATIENT
Start: 2019-07-14 | End: 2019-07-14

## 2019-07-14 RX ADMIN — SODIUM CHLORIDE 1000 ML: 9 INJECTION, SOLUTION INTRAVENOUS at 10:08

## 2019-07-14 RX ADMIN — ACETAMINOPHEN 650 MG: 325 TABLET ORAL at 10:08

## 2019-07-14 ASSESSMENT — PAIN DESCRIPTION - ORIENTATION: ORIENTATION: LEFT

## 2019-07-14 ASSESSMENT — PAIN DESCRIPTION - PAIN TYPE
TYPE: ACUTE PAIN
TYPE: ACUTE PAIN

## 2019-07-14 ASSESSMENT — PAIN DESCRIPTION - LOCATION: LOCATION: BACK;HIP;ARM

## 2019-07-14 ASSESSMENT — PAIN SCALES - GENERAL
PAINLEVEL_OUTOF10: 8
PAINLEVEL_OUTOF10: 10

## 2019-07-14 NOTE — ED PROVIDER NOTES
Procedure Laterality Date    COLONOSCOPY  2016    polyps - per patient    VA EX TERRA/VASC MALF SFT TISS HAND/FNGR SUBQ 1.5+CM Right 10/3/2018    WOUND EXPLORATION, REMOVAL DEEP FOREIGN BODY RIGHT HAND performed by Carole Deng MD at [address]     Family History   Problem Relation Age of Onset    Other Mother         aneurysm    Diabetes Mother     High Blood Pressure Mother     High Cholesterol Mother     Heart Disease Father     Diabetes Father     High Blood Pressure Father     High Cholesterol Father      Social History     Socioeconomic History    Marital status: Single     Spouse name: Not on file    Number of children: Not on file    Years of education: Not on file    Highest education level: Not on file   Occupational History    Occupation:  Construction     Comment: SSI since CVA with R sided weakness   Social Needs    Financial resource strain: Not on file    Food insecurity:     Worry: Not on file     Inability: Not on file   [employer] needs:     Medical: Not on file     Non-medical: Not on file   Tobacco Use    Smoking status: Never Smoker    Smokeless tobacco: Never Used   Substance and Sexual Activity    Alcohol use:  Yes     Alcohol/week: 74.0 standard drinks     Types: 74 Cans of beer per week     Comment: 12 pk a night--denies any since 9-1-18    Drug use: Yes     Types: Marijuana     Comment: daily- denies any use for awhile 9-2018    Sexual activity: Yes   Lifestyle    Physical activity:     Days per week: Not on file     Minutes per session: Not on file    Stress: Not on file   Relationships    Social connections:     Talks on phone: Not on file     Gets together: Not on file     Attends [employer] service: Not on file     Active member of club or organization: Not on file     Attends meetings of clubs or organizations: Not on file     Relationship status: Not on file    Intimate partner violence:     Fear of current or ex partner: Not on file PSYCHIATRIC: Normal mood and affect. RADIOLOGY  Ct Head Wo Contrast    Result Date: 7/14/2019  EXAMINATION: CT OF THE HEAD WITHOUT CONTRAST  7/14/2019 10:29 am TECHNIQUE: CT of the head was performed without the administration of intravenous contrast. Dose modulation, iterative reconstruction, and/or weight based adjustment of the mA/kV was utilized to reduce the radiation dose to as low as reasonably achievable. COMPARISON: 08/21/2018 HISTORY: ORDERING SYSTEM PROVIDED HISTORY: fall, injury TECHNOLOGIST PROVIDED HISTORY: Has a \"code stroke\" or \"stroke alert\" been called? ->No Reason for Exam: Had a seizure last night and fell out of chair, headache today Acuity: Acute Type of Exam: Initial FINDINGS: BRAIN/VENTRICLES: There is no acute intracerebral hemorrhage or extra-axial fluid collection. There is mild cerebral atrophy with mild periventricular and deep white matter small vessel ischemic disease. Old lacunar infarcts involve the left basal ganglia and thalamus. ORBITS: The orbits are unremarkable. SINUSES: The visualized paranasal sinuses and mastoid air cells are clear. SOFT TISSUES/SKULL:  The calvarium is intact. 1. No acute intracranial abnormality. Ct Cervical Spine Wo Contrast    Result Date: 7/14/2019  EXAMINATION: CT OF THE CERVICAL SPINE WITHOUT CONTRAST 7/14/2019 10:29 am: TECHNIQUE: CT of the cervical spine was performed without the administration of intravenous contrast. Multiplanar reformatted images are provided for review. Dose modulation, iterative reconstruction, and/or weight based adjustment of the mA/kV was utilized to reduce the radiation dose to as low as reasonably achievable. COMPARISON: 06/28/2016 HISTORY: ORDERING SYSTEM PROVIDED HISTORY: fall TECHNOLOGIST PROVIDED HISTORY: Reason for Exam: Had a seizure last night and fell out of chair, posterior neck pain Acuity: Acute Type of Exam: Initial FINDINGS: BONES/ALIGNMENT: There is no acute fracture.   The 7 cervical vertebral bodies are in anatomic alignment. There straightening of the normal cervical lordosis. The craniocervical junction is intact. DEGENERATIVE CHANGES: There is moderate to severe multilevel spondylosis and facet arthropathy, most significant at C6-7. SOFT TISSUES: There is no prevertebral soft tissue swelling. 1.  No acute fracture. Ct Lumbar Spine Wo Contrast    Result Date: 7/14/2019  EXAMINATION: CT OF THE LUMBAR SPINE WITHOUT CONTRAST  7/14/2019 TECHNIQUE: CT of the lumbar spine was performed without the administration of intravenous contrast. Multiplanar reformatted images are provided for review. Dose modulation, iterative reconstruction, and/or weight based adjustment of the mA/kV was utilized to reduce the radiation dose to as low as reasonably achievable. COMPARISON: None HISTORY: ORDERING SYSTEM PROVIDED HISTORY: pain, fall TECHNOLOGIST PROVIDED HISTORY: Reason for exam:->pain, fall Reason for Exam: Had a seizure last night and fell out of chair, Lower back pain into left hip Acuity: Acute Type of Exam: Initial FINDINGS: BONES/ALIGNMENT: There is normal alignment of the spine. The vertebral body heights are maintained. No osseous destructive lesion is seen. There is partial sacralization of the left L5 vertebral body. DEGENERATIVE CHANGES: There is mild multilevel spondylosis and facet arthropathy. SOFT TISSUES/RETROPERITONEUM: No paraspinal mass is seen. Mild atherosclerosis involves the abdominal aorta and bilateral common iliac arteries. Unremarkable non-contrast CT of the lumbar spine. Xr Shoulder Left (min 2 Views)    Result Date: 7/14/2019  EXAMINATION: 3 XRAY VIEWS OF THE LEFT SHOULDER 7/14/2019 10:20 am COMPARISON: None. HISTORY: ORDERING SYSTEM PROVIDED HISTORY: pain, fall TECHNOLOGIST PROVIDED HISTORY: Reason for exam:->pain, fall FINDINGS: There is mild left acromioclavicular joint osteoarthrosis with articular sclerosis and spurring.  There is at least moderate left glenohumeral

## 2019-07-14 NOTE — ED NOTES
Pt resting comfortably in bed with eyes closed upon arrival to room. No distress noted. RR>16. Upon arousal; pt moans and when asked what pt's pain level is on 0-10 scale pt states \"10. \"      Gigi Roach RN  07/14/19 [id number]

## 2019-07-14 NOTE — ED NOTES
Bed: 23  Expected date:   Expected time:   Means of arrival:   Comments:  julien Santana RN  07/14/19 [id number]

## 2019-07-18 ENCOUNTER — OFFICE VISIT (OUTPATIENT)
Dept: INTERNAL MEDICINE CLINIC | Age: 64
End: 2019-07-18

## 2019-07-18 VITALS
WEIGHT: 230 LBS | DIASTOLIC BLOOD PRESSURE: 80 MMHG | OXYGEN SATURATION: 97 % | BODY MASS INDEX: 36.1 KG/M2 | HEART RATE: 64 BPM | HEIGHT: 67 IN | SYSTOLIC BLOOD PRESSURE: 132 MMHG

## 2019-07-18 DIAGNOSIS — N52.9 ERECTILE DYSFUNCTION, UNSPECIFIED ERECTILE DYSFUNCTION TYPE: Primary | ICD-10-CM

## 2019-07-18 PROCEDURE — 99203 OFFICE O/P NEW LOW 30 MIN: CPT | Performed by: UROLOGY

## 2019-07-18 RX ORDER — SPIRONOLACTONE 25 MG/1
TABLET ORAL
Qty: 30 TABLET | Refills: 8 | Status: SHIPPED | OUTPATIENT
Start: 2019-07-18 | End: 2020-04-09

## 2019-08-13 ENCOUNTER — OFFICE VISIT (OUTPATIENT)
Dept: INTERNAL MEDICINE CLINIC | Age: 64
End: 2019-08-13

## 2019-08-13 VITALS
WEIGHT: 228 LBS | HEIGHT: 67 IN | SYSTOLIC BLOOD PRESSURE: 150 MMHG | TEMPERATURE: 98.2 F | HEART RATE: 68 BPM | DIASTOLIC BLOOD PRESSURE: 86 MMHG | OXYGEN SATURATION: 96 % | BODY MASS INDEX: 35.79 KG/M2

## 2019-08-13 DIAGNOSIS — R60.0 PEDAL EDEMA: Primary | ICD-10-CM

## 2019-08-13 DIAGNOSIS — B18.2 CHRONIC HEPATITIS C WITHOUT HEPATIC COMA (HCC): ICD-10-CM

## 2019-08-13 PROCEDURE — 99214 OFFICE O/P EST MOD 30 MIN: CPT | Performed by: INTERNAL MEDICINE

## 2019-08-17 ENCOUNTER — APPOINTMENT (OUTPATIENT)
Dept: GENERAL RADIOLOGY | Age: 64
End: 2019-08-17
Payer: MEDICAID

## 2019-08-17 ENCOUNTER — HOSPITAL ENCOUNTER (EMERGENCY)
Age: 64
Discharge: HOME | End: 2019-08-17
Payer: MEDICAID

## 2019-08-17 VITALS
BODY MASS INDEX: 35.71 KG/M2 | TEMPERATURE: 98 F | SYSTOLIC BLOOD PRESSURE: 142 MMHG | RESPIRATION RATE: 20 BRPM | DIASTOLIC BLOOD PRESSURE: 74 MMHG | OXYGEN SATURATION: 96 % | WEIGHT: 228 LBS | HEART RATE: 72 BPM

## 2019-08-17 DIAGNOSIS — S20.211A CONTUSION OF RIB ON RIGHT SIDE, INITIAL ENCOUNTER: Primary | ICD-10-CM

## 2019-08-17 DIAGNOSIS — R07.81 RIB PAIN ON RIGHT SIDE: ICD-10-CM
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
## 2019-08-17 PROCEDURE — 71101 X-RAY EXAM UNILAT RIBS/CHEST: CPT

## 2019-08-17 PROCEDURE — 6370000000 HC RX 637 (ALT 250 FOR IP): Performed by: NURSE PRACTITIONER

## 2019-08-17 PROCEDURE — 99283 EMERGENCY DEPT VISIT LOW MDM: CPT

## 2019-08-17 RX ORDER — CYCLOBENZAPRINE HCL 10 MG
10 TABLET ORAL 3 TIMES DAILY PRN
Qty: 21 TABLET | Refills: 0 | Status: SHIPPED | OUTPATIENT
Start: 2019-08-17 | End: 2019-08-27

## 2019-08-17 RX ORDER — HYDROCODONE BITARTRATE AND ACETAMINOPHEN 5; 325 MG/1; MG/1
1 TABLET ORAL EVERY 6 HOURS PRN
Qty: 20 TABLET | Refills: 0 | Status: SHIPPED | OUTPATIENT
Start: 2019-08-17 | End: 2019-08-20

## 2019-08-17 RX ORDER — LIDOCAINE 4 G/G
1 PATCH TOPICAL ONCE
Status: DISCONTINUED | OUTPATIENT
Start: 2019-08-17 | End: 2019-08-17 | Stop reason: HOSPADM

## 2019-08-17 RX ORDER — IBUPROFEN 800 MG/1
800 TABLET ORAL ONCE
Status: COMPLETED | OUTPATIENT
Start: 2019-08-17 | End: 2019-08-17

## 2019-08-17 RX ORDER — HYDROCODONE BITARTRATE AND ACETAMINOPHEN 5; 325 MG/1; MG/1
1 TABLET ORAL ONCE
Status: COMPLETED | OUTPATIENT
Start: 2019-08-17 | End: 2019-08-17

## 2019-08-17 RX ORDER — IBUPROFEN 800 MG/1
800 TABLET ORAL EVERY 8 HOURS PRN
Qty: 20 TABLET | Refills: 0 | Status: SHIPPED | OUTPATIENT
Start: 2019-08-17 | End: 2020-01-24

## 2019-08-17 RX ORDER — CYCLOBENZAPRINE HCL 10 MG
10 TABLET ORAL ONCE
Status: COMPLETED | OUTPATIENT
Start: 2019-08-17 | End: 2019-08-17

## 2019-08-17 RX ADMIN — CYCLOBENZAPRINE HYDROCHLORIDE 10 MG: 10 TABLET, FILM COATED ORAL at 10:20

## 2019-08-17 RX ADMIN — IBUPROFEN 800 MG: 800 TABLET, FILM COATED ORAL at 10:19

## 2019-08-17 RX ADMIN — HYDROCODONE BITARTRATE AND ACETAMINOPHEN 1 TABLET: 5; 325 TABLET ORAL at 10:20

## 2019-08-17 ASSESSMENT — PAIN SCALES - GENERAL: PAINLEVEL_OUTOF10: 8

## 2019-08-17 NOTE — ED PROVIDER NOTES
pleural effusion. No new airspace disease. Right ribs: No acute fracture or destructive bony abnormality. 1. No right rib abnormality identified 2. Persistent elevation of the left hemidiaphragm with evidence for a left pleural effusion     ED COURSE/MDM  Patient seen and evaluated. Old records reviewed. Diagnostic testing reviewed and results discussed. I have evaluated this patient. My supervising physician was available for consultation. Betsy Manuel presented to the ED today with above noted complaints. Physical exam revealed  Right anterior and lateral rib cage tenderness to palpation. Patient states it is painful to move and change positions, take a deep breath, and cough. Denies shortness of breath and difficulty breathing except for pain. Lung sounds bilaterally are normal, withgood air movement in all lung fields. Trachea is midline, chest rise is equal and symmetrical. Patient's description is consistent with rib contusion vs fracture. Right side rib Xray series was ordered, and without acute findings. I feel patient's symptoms are most consistent with rib contusion versus an occult rib fracture. At thispoint I do not feel the patient requires further work up and it is reasonable to discharge the patient. The patient was instructed on use of incentive spirometer and was able to return demonstrate proper use. Patient wasinstructed to use this 4 times a day to help prevent pneumonia. The patient will be sent home with prescriptions for ibuprofen, Norco, Flexeril. We also discussed obtaining Lidoderm pain patches if the one placed in the ED provides relief. I advised them they can get this over-the-counter. Patient was instructed to follow up with PCP if symptoms do not improve over the next 10-14 days. Please refer to AVS for further details regarding discharge instructions.     While in ED patient received   Medications   lidocaine 4 % external patch 1 patch (1 patch Transdermal Patch

## 2019-08-26 ENCOUNTER — TELEPHONE (OUTPATIENT)
Dept: ORTHOPEDIC SURGERY | Age: 64
End: 2019-08-26

## 2019-08-26 ENCOUNTER — OFFICE VISIT (OUTPATIENT)
Dept: ORTHOPEDIC SURGERY | Age: 64
End: 2019-08-26
Payer: MEDICAID

## 2019-08-26 VITALS — WEIGHT: 227.96 LBS | BODY MASS INDEX: 35.78 KG/M2 | HEIGHT: 67 IN

## 2019-08-26 DIAGNOSIS — R52 PAIN: Primary | ICD-10-CM

## 2019-08-26 DIAGNOSIS — M21.41 PES PLANUS OF BOTH FEET: ICD-10-CM

## 2019-08-26 DIAGNOSIS — M76.71 PERONEAL TENDINITIS OF RIGHT LOWER EXTREMITY: ICD-10-CM

## 2019-08-26 DIAGNOSIS — M21.42 PES PLANUS OF BOTH FEET: ICD-10-CM

## 2019-08-26 DIAGNOSIS — M79.89 RIGHT LEG SWELLING: ICD-10-CM

## 2019-08-26 PROCEDURE — 99203 OFFICE O/P NEW LOW 30 MIN: CPT | Performed by: FAMILY MEDICINE

## 2019-08-26 PROCEDURE — L3040 FT ARCH SUPRT PREMOLD LONGIT: HCPCS | Performed by: FAMILY MEDICINE

## 2019-08-26 PROCEDURE — L4360 PNEUMAT WALKING BOOT PRE CST: HCPCS | Performed by: FAMILY MEDICINE

## 2019-08-26 PROCEDURE — MISC250 COMPRESSION STOCKING: Performed by: FAMILY MEDICINE

## 2019-08-26 RX ORDER — METHYLPREDNISOLONE 4 MG/1
TABLET ORAL
Qty: 21 KIT | Refills: 0 | Status: SHIPPED | OUTPATIENT
Start: 2019-08-26 | End: 2019-11-20 | Stop reason: ALTCHOICE

## 2019-08-26 NOTE — PROGRESS NOTES
Chief Complaint  Ankle Pain (OPNP RIGHT ANKLE)    Initial consultation ongoing right lower leg and lateral ankle pain with ongoing swelling    History of Present Illness:  Afua Whitaker is a 59 y.o. male is a very pleasant right-hand-dominant  male former  and  who is status post CVA with cerebral aneurysm and hepatitis as well as seizure disorder who is being seen today upon subacute referral from the emergency room in [facility] for evaluation of ongoing pain to his right posterior lateral ankle and tightness to his calf. New Patient:       Patient is being seen today for acute right ankle and calf pain. The patient reports about 2 months ago he believes he was stung or sustained a bite while in the ocean. He cannot definitively identify whether or not this was actually a jellyfish but did have pain followed by progressive swelling. He was initially evaluated for this at the formerly [facility] ER where he was given Clindamycin that helped some for presumed cellulitis. He did have a negative venous Doppler at [address] [facility] on 6/28/2019. Lyly Talley He then described about 1 month ago having a seizure episode where he then felt a pop on the lateral aspect of his ankle. This was primarily in the area of the peroneal tendon and since that time is had persistence of pain and swelling and difficulty walking. He describes the symptoms as aching, sharp and tight. The problem is worse when he is walking or standing. Patient has attempted rest, ice, and compression to treat this problem and symptoms show no change. He has not been taking much in the way of medications nor is he had any boot immobilization. He is on chronic Plavix. Patient is being seen today for orthopedic and sports consultation and review of imaging.      Pain Assessment  Location of Pain: Ankle  Location Modifiers: Right  Severity of Pain: 5  Quality of Pain: Sharp, Aching  Frequency of Pain: Constant  Aggravating Factors: Standing, Walking, Stairs  Limiting Behavior: Some  Relieving Factors: Rest  Work-Related Injury: No  Are there other pain locations you wish to document?: No     I have reviewed and attest the documentation of the HPI documented by my . I will make any changes if necessary. Enc Date: 8/26/2019  Time: 4:16 PM  Provider: Tito Medina MD        Review of Systems  Pertinent items are noted in HPI  Review of systems reviewed from Patient History Form dated on 8/26/2019 and available in the patient's chart under the Media tab. Vital Signs     Ht 5' 7.01\" (1.702 m)   Wt 227 lb 15.3 oz (103.4 kg)   BMI 35.69 kg/m²     Past Medical History   has a past medical history of [facility] Asthma, Cerebral artery occlusion with cerebral infarction [facility]), History of hepatitis C, Hyperlipidemia, Hypertension, and Seizure disorder ([facility] Past Surgical History   has a past surgical history that includes Colonoscopy (2016) and pr ex ronen/vasc malf sft tiss hand/fngr subq 1.5+cm (Right, 10/3/2018). Social History     Tobacco Use    Smoking status: Never Smoker    Smokeless tobacco: Never Used   Substance Use Topics    Alcohol use: Yes     Alcohol/week: 74.0 standard drinks     Types: 74 Cans of beer per week     Comment: 12 pk a night--denies any since 9-1-18    Drug use: Yes     Types: Marijuana     Comment: daily- denies any use for awhile 9-2018        Current Outpatient Medications   Medication Sig Dispense Refill    diclofenac sodium (VOLTAREN) 1 % GEL Apply 4 g topically 4 times daily 5 Tube 3    methylPREDNISolone (MEDROL DOSEPACK) 4 MG tablet Take by mouth as directed.  21 kit 0    ibuprofen (ADVIL;MOTRIN) 800 MG tablet Take 1 tablet by mouth every 8 hours as needed for Pain or Fever 20 tablet 0    cyclobenzaprine (FLEXERIL) 10 MG tablet Take 1 tablet by mouth 3 times daily as needed for Muscle spasms 21 tablet 0    spironolactone (ALDACTONE) 25 MG tablet TAKE (1) TABLET BY MOUTH DAILY 30 tablet 8

## 2019-08-26 NOTE — TELEPHONE ENCOUNTER
8/26/19  DME   - NOT COVERED - FOLLOW MEDICAID FEE SCHEDULE AND ITEM NOT ON FEE SCHEDULE - SPLIT CODING CROSSWALK FOR MEDICAID  IS COVERED AND NO PRECERT REQUIRED - PER NOTES - NDS

## 2019-08-27 ENCOUNTER — HOSPITAL ENCOUNTER (OUTPATIENT)
Dept: MRI IMAGING | Age: 64
Discharge: HOME | End: 2019-08-27
Payer: MEDICAID

## 2019-08-27 ENCOUNTER — HOSPITAL ENCOUNTER (OUTPATIENT)
Dept: GENERAL RADIOLOGY | Age: 64
Discharge: HOME | End: 2019-08-27
Payer: MEDICAID

## 2019-08-27 DIAGNOSIS — M76.71 PERONEAL TENDINITIS OF RIGHT LOWER EXTREMITY: ICD-10-CM

## 2019-08-27 DIAGNOSIS — M21.41 PES PLANUS OF BOTH FEET: ICD-10-CM

## 2019-08-27 DIAGNOSIS — M79.89 RIGHT LEG SWELLING: ICD-10-CM

## 2019-08-27 DIAGNOSIS — R52 PAIN: ICD-10-CM

## 2019-08-27 DIAGNOSIS — Z18.10 RETAINED METAL FRAGMENT: ICD-10-CM

## 2019-08-27 DIAGNOSIS — M21.42 PES PLANUS OF BOTH FEET: ICD-10-CM

## 2019-08-27 PROCEDURE — 70030 X-RAY EYE FOR FOREIGN BODY: CPT

## 2019-08-27 PROCEDURE — 73721 MRI JNT OF LWR EXTRE W/O DYE: CPT

## 2019-08-28 ENCOUNTER — OFFICE VISIT (OUTPATIENT)
Dept: ORTHOPEDIC SURGERY | Age: 64
End: 2019-08-28
Payer: MEDICAID

## 2019-08-28 VITALS — WEIGHT: 227.96 LBS | BODY MASS INDEX: 35.78 KG/M2 | HEIGHT: 67 IN

## 2019-08-28 DIAGNOSIS — M25.571 ACUTE RIGHT ANKLE PAIN: ICD-10-CM

## 2019-08-28 DIAGNOSIS — M25.471 RIGHT ANKLE SWELLING: ICD-10-CM

## 2019-08-28 DIAGNOSIS — M21.42 PES PLANUS OF BOTH FEET: ICD-10-CM

## 2019-08-28 DIAGNOSIS — S93.411A SPRAIN OF CALCANEOFIBULAR LIGAMENT OF RIGHT ANKLE, INITIAL ENCOUNTER: ICD-10-CM

## 2019-08-28 DIAGNOSIS — M76.71 PERONEAL TENDINITIS OF RIGHT LOWER EXTREMITY: Primary | ICD-10-CM

## 2019-08-28 DIAGNOSIS — M21.41 PES PLANUS OF BOTH FEET: ICD-10-CM

## 2019-08-28 DIAGNOSIS — S93.421A SPRAIN OF DELTOID LIGAMENT OF RIGHT ANKLE, INITIAL ENCOUNTER: ICD-10-CM

## 2019-08-28 PROCEDURE — 99213 OFFICE O/P EST LOW 20 MIN: CPT | Performed by: FAMILY MEDICINE

## 2019-08-28 NOTE — PROGRESS NOTES
MD        Social History     Tobacco Use    Smoking status: Never Smoker    Smokeless tobacco: Never Used   Substance Use Topics    Alcohol use: Yes     Alcohol/week: 74.0 standard drinks     Types: 74 Cans of beer per week     Comment: 12 pk a night--denies any since 9-1-18    Drug use: Yes     Types: Marijuana     Comment: daily- denies any use for awhile 9-2018        Review of Systems  Pertinent items are noted in HPI  Review of systems reviewed from Patient History Form dated on 8/28/2019 and available in the patient's chart under the Media tab. Vital Signs     Ht 5' 7.01\" (1.702 m)   Wt 227 lb 15.3 oz (103.4 kg)   BMI 35.69 kg/m²       General Exam:   Constitutional: Patient is adequately groomed with no evidence of malnutrition  DTRs: Deep tendon reflexes are intact  Mental Status: The patient is oriented to time, place and person. The patient's mood and affect are appropriate. Lymphatic: The lymphatic examination bilaterally reveals all areas to be without enlargement or induration. Vascular: Examination reveals no swelling or calf tenderness. Peripheral pulses are palpable and 2+. Neurological: The patient has good coordination. There is no weakness or sensory deficit. Right ankle examination          Inspection: There is no high-grade deformity. Swelling is improved. Ecchymosis improved laterally. No tense effusion    Palpation: He still has tenderness over the ATFL but also over the CF ligaments and peroneal greater than posterior tib    Rang of Motion: 50% reduction in ankle motion. Strength: 4/5 with resisted inversion eversion and dorsiflexion. Special Tests: 1+ mildly painful drawer test.  Negative talar tilt and Murphy's testing. Skin: There are no rashes, ulcerations or lesions. Distal motor sensory and vascular exam is intact. Gait: Improving gait in boot.     Reflex symmetrically preserved      Additional Comments:             Additional Examinations:

## 2019-09-04 ENCOUNTER — HOSPITAL ENCOUNTER (OUTPATIENT)
Dept: PHYSICAL THERAPY | Age: 64
Setting detail: THERAPIES SERIES
Discharge: HOME | End: 2019-09-04
Payer: MEDICAID

## 2019-09-04 PROCEDURE — 97016 VASOPNEUMATIC DEVICE THERAPY: CPT

## 2019-09-04 PROCEDURE — 97161 PT EVAL LOW COMPLEX 20 MIN: CPT

## 2019-09-04 PROCEDURE — 97110 THERAPEUTIC EXERCISES: CPT

## 2019-09-04 NOTE — PLAN OF CARE
[place] [facility] [facility], [address] [facility]  [address] [place] [address], [address] [place] [address] [facility]  Phone: [phone]  Fax [phone]     Physical Therapy Certification    Dear Referring Practitioner: Dr. Regis Hinojosa,    We had the pleasure of evaluating the following patient for physical therapy services at [address] [place] [address]. A summary of our findings can be found in the initial assessment below. This includes our plan of care. If you have any questions or concerns regarding these findings, please do not hesitate to contact me at the office phone number checked above. Thank you for the referral.       Physician Signature:_______________________________Date:__________________  By signing above (or electronic signature), therapists plan is approved by physician    Patient: Shana Duong   : 1955   MRN: [id number]  Referring Physician: Referring Practitioner: Dr. Regis Hinojosa      Evaluation Date: 2019      Medical Diagnosis Information:  Diagnosis: Sprain CFL of right ankle (S93.411D), peroneal tendonitis (M76.71)   Treatment Diagnosis: R ankle pain (M25.571)                                         Insurance information: PT Insurance Information: Medicaid       Precautions/ Contra-indications: Hx of stroke (R side affected), seizures   Latex Allergy:  [x]NO      []YES  Preferred Language for Healthcare:   [x]English       []other:    SUBJECTIVE: Patient stated complaint: Patient states he got bit by something in the ocean, had a swollen leg for about 3 months. States it has been feeling a lot better since he got a compression stocking. Patient reports he wore a walking boot for 1 week. States he got out of the boot on Saturday. States it isn't hurting now. Patient reports he had a stroke about 3 years ago, his right side was affected and still has some residual weakness. States he started having seizures after the stroke.       After exam pt with definite already being addressed at this time. Co-morbidities/Complexities (which will affect course of rehabilitation):   []None           Arthritic conditions   []Rheumatoid arthritis (M05.9)  []Osteoarthritis (M19.91)   Cardiovascular conditions   [x]Hypertension (I10)  []Hyperlipidemia (E78.5)  []Angina pectoris (I20)  []Atherosclerosis (I70)   Musculoskeletal conditions   []Disc pathology   []Congenital spine pathologies   []Prior surgical intervention  []Osteoporosis (M81.8)  []Osteopenia (M85.8)   Endocrine conditions   []Hypothyroid (E03.9)  []Hyperthyroid Gastrointestinal conditions   []Constipation (H91.07)   Metabolic conditions   []Morbid obesity (E66.01)  []Diabetes type 1(E10.65) or 2 (E11.65)   []Neuropathy (G60.9)     Pulmonary conditions   []Asthma (J45)  []Coughing   []COPD (J44.9)   Psychological Disorders  [x]Anxiety (F41.9)  [x]Depression (F32.9)   []Other:   [x]Other:  stroke        Barriers to/and or personal factors that will affect rehab potential:              []Age  []Sex              []Motivation/Lack of Motivation                        [x]Co-Morbidities              []Cognitive Function, education/learning barriers              []Environmental, home barriers              []profession/work barriers  [x]past PT/medical experience  [x]other: pt poor historian  Justification:     Falls Risk Assessment (30 days):  [x] Falls Risk assessed and no intervention required.   [] Falls Risk assessed and Patient requires intervention due to being higher risk   TUG score (>12s at risk):     [] Falls education provided, including       G-Codes:   LEFS 61%    ASSESSMENT:   Functional Impairments:     [x]Noted lumbar/proximal hip/LE joint hypomobility   [x]Decreased LE functional ROM   [x]Decreased core/proximal hip strength and neuromuscular control   [x]Decreased LE functional strength   [x]Reduced balance/proprioceptive control   []other:      Functional Activity Limitations (from functional questionnaire the plan of care  [x]3 personal factors and/or comorbidities that impact the plan of care  [x] An examination of body systems using standardized tests and measures addressing any of the following: body structures and functions (impairments), activity limitations, and/or participation restrictions;:  [x] a total of 1-2 or more elements   [] a total of 3 or more elements   [] a total of 4 or more elements   [x] A clinical presentation with:  [] stable and/or uncomplicated characteristics   [x] evolving clinical presentation with changing characteristics  [] unstable and unpredictable characteristics;   [x] Clinical decision making of [x] low, [] moderate, [] high complexity using standardized patient assessment instrument and/or measurable assessment of functional outcome. [x] EVAL (LOW) 64610 (typically 20 minutes face-to-face)  [] EVAL (MOD) 50903 (typically 30 minutes face-to-face)  [] EVAL (HIGH) 05538 (typically 45 minutes face-to-face)  [] RE-EVAL       PLAN:   Frequency/Duration:  1-2 days per week for 4-6 Weeks:  Interventions:  [x]  Therapeutic exercise including: strength training, ROM, for Lower extremity and core   [x]  NMR activation and proprioception for LE, Glutes and Core   [x]  Manual therapy as indicated for LE, Hip and spine to include: Dry Needling/IASTM, STM, PROM, Gr I-IV mobilizations, manipulation. [x] Modalities as needed that may include: thermal agents, E-stim, Biofeedback, US, iontophoresis as indicated  [x] Patient education on joint protection, postural re-education, activity modification, progression of HEP. HEP instruction: (see scanned forms)    GOALS:  Patient stated goal: \"Improve strength\"    Therapist goals for Patient:   Short Term Goals: To be achieved in: 2 weeks  1. Independent in HEP and progression per patient tolerance, in order to prevent re-injury.    2. Patient will have a decrease in pain to facilitate improvement in movement, function, and ADLs as indicated by

## 2019-09-04 NOTE — FLOWSHEET NOTE
[place] [facility] [facility] [facility], [address] [facility]  Phone: [phone]  Fax [phone]    Physical Therapy Daily Treatment Note  Date:  2019    Patient Name:  Afua Whitaker    :  1955  MRN: [id number]  Restrictions/Precautions:    Medical/Treatment Diagnosis Information:  · Diagnosis: Sprain CFL of right ankle (S93.411D), peroneal tendonitis (M76.71)  · Treatment Diagnosis: R ankle pain (F61.157)  Insurance/Certification information:  PT Insurance Information: Medicaid  Physician Information:  Referring Practitioner: Dr. Adela Simental of care signed (Y/N):     Date of Patient follow up with Physician:     G-Code (if applicable):  LEFS 48%    Date G-Code Applied:  19       Progress Note: [x]  Yes  []  No  Next due by: Visit #10       Latex Allergy:  [x]NO      []YES  Preferred Language for Healthcare:   [x]English       []other:    Visit # Insurance Allowable Requires auth   1 Check EOB [place]    []no        []yes:       Pain level:  0-8/10     SUBJECTIVE:  See eval    OBJECTIVE: See eval   Observation:    Test measurements:      RESTRICTIONS/PRECAUTIONS: seizures, hx of stroke, R sided weakness    Exercises/Interventions:     Therapeutic Ex     minutes: Sets/sec Reps Notes   Calf stretch 30\" 3 HEP   AROM PF/DF and inv/ev 20x ea  HEP   HS stretch 30\" 3 HEP   PF into lime band 20x  HEP; cues for form                                 Pt ed 8'  Ice, HEP, POC, compression sock, activity mod         Manual Intervention     minutes:                                          NMR re-education     minutes:                                                Therapeutic Exercise and NMR EXR  [x] (17183) Provided verbal/tactile cueing for activities related to strengthening, flexibility, endurance, ROM for improvements in LE, proximal hip, and core control with self care, mobility, lifting, ambulation.  [] (91541) Provided verbal/tactile cueing

## 2019-09-11 ENCOUNTER — HOSPITAL ENCOUNTER (OUTPATIENT)
Dept: PHYSICAL THERAPY | Age: 64
Setting detail: THERAPIES SERIES
Discharge: HOME | End: 2019-09-11
Payer: MEDICAID

## 2019-09-11 PROCEDURE — 97110 THERAPEUTIC EXERCISES: CPT

## 2019-09-11 PROCEDURE — 97140 MANUAL THERAPY 1/> REGIONS: CPT

## 2019-09-11 PROCEDURE — 97016 VASOPNEUMATIC DEVICE THERAPY: CPT

## 2019-09-18 ENCOUNTER — HOSPITAL ENCOUNTER (OUTPATIENT)
Dept: PHYSICAL THERAPY | Age: 64
Setting detail: THERAPIES SERIES
End: 2019-09-18
Payer: MEDICAID

## 2019-09-25 ENCOUNTER — APPOINTMENT (OUTPATIENT)
Dept: PHYSICAL THERAPY | Age: 64
End: 2019-09-25
Payer: MEDICAID

## 2019-09-25 RX ORDER — ASPIRIN 81 MG/1
TABLET, COATED ORAL
Qty: 30 TABLET | Refills: 10 | Status: SHIPPED | OUTPATIENT
Start: 2019-09-25

## 2019-09-25 RX ORDER — ATORVASTATIN CALCIUM 40 MG/1
40 TABLET, FILM COATED ORAL NIGHTLY
Qty: 30 TABLET | Refills: 10 | Status: SHIPPED | OUTPATIENT
Start: 2019-09-25

## 2019-09-25 RX ORDER — CLOPIDOGREL BISULFATE 75 MG/1
TABLET ORAL
Qty: 30 TABLET | Refills: 10 | Status: SHIPPED | OUTPATIENT
Start: 2019-09-25

## 2019-09-25 RX ORDER — CITALOPRAM 20 MG/1
20 TABLET ORAL DAILY
Qty: 30 TABLET | Refills: 10 | Status: SHIPPED | OUTPATIENT
Start: 2019-09-25

## 2019-10-22 RX ORDER — LOSARTAN POTASSIUM 100 MG/1
TABLET ORAL
Qty: 30 TABLET | Refills: 3 | Status: SHIPPED | OUTPATIENT
Start: 2019-10-22 | End: 2020-02-17

## 2019-10-22 RX ORDER — UBIDECARENONE 30 MG
CAPSULE ORAL
Qty: 90 TABLET | Refills: 10 | Status: SHIPPED | OUTPATIENT
Start: 2019-10-22

## 2019-10-24 ENCOUNTER — TELEPHONE (OUTPATIENT)
Dept: INTERNAL MEDICINE CLINIC | Age: 64
End: 2019-10-24

## 2019-10-24 ENCOUNTER — HOSPITAL ENCOUNTER (OUTPATIENT)
Age: 64
Discharge: HOME | End: 2019-10-24
Payer: MEDICAID

## 2019-10-24 ENCOUNTER — OFFICE VISIT (OUTPATIENT)
Dept: INTERNAL MEDICINE CLINIC | Age: 64
End: 2019-10-24

## 2019-10-24 VITALS
WEIGHT: 232 LBS | SYSTOLIC BLOOD PRESSURE: 150 MMHG | BODY MASS INDEX: 36.41 KG/M2 | DIASTOLIC BLOOD PRESSURE: 80 MMHG | HEART RATE: 76 BPM | HEIGHT: 67 IN | OXYGEN SATURATION: 96 %

## 2019-10-24 DIAGNOSIS — F51.01 PRIMARY INSOMNIA: ICD-10-CM

## 2019-10-24 DIAGNOSIS — I10 ESSENTIAL HYPERTENSION: ICD-10-CM

## 2019-10-24 DIAGNOSIS — Z86.73 HISTORY OF STROKE: ICD-10-CM

## 2019-10-24 DIAGNOSIS — E78.5 DYSLIPIDEMIA: ICD-10-CM

## 2019-10-24 DIAGNOSIS — K62.5 RECTAL BLEEDING: ICD-10-CM

## 2019-10-24 DIAGNOSIS — N52.9 ERECTILE DYSFUNCTION, UNSPECIFIED ERECTILE DYSFUNCTION TYPE: ICD-10-CM

## 2019-10-24 DIAGNOSIS — K62.5 RECTAL BLEEDING: Primary | ICD-10-CM

## 2019-10-24 DIAGNOSIS — G40.909 SEIZURE DISORDER (HCC): ICD-10-CM

## 2019-10-24 LAB
BASOPHILS ABSOLUTE: 0.1 K/UL (ref 0–0.2)
BASOPHILS RELATIVE PERCENT: 1.1 %
EOSINOPHILS ABSOLUTE: 0.1 K/UL (ref 0–0.6)
EOSINOPHILS RELATIVE PERCENT: 2.1 %
HCT VFR BLD CALC: 46.3 % (ref 40.5–52.5)
HEMOGLOBIN: 15.7 G/DL (ref 13.5–17.5)
IRON SATURATION: 29 % (ref 20–50)
IRON: 106 UG/DL (ref 59–158)
LYMPHOCYTES ABSOLUTE: 0.9 K/UL (ref 1–5.1)
LYMPHOCYTES RELATIVE PERCENT: 15.2 %
MCH RBC QN AUTO: 30.8 PG (ref 26–34)
MCHC RBC AUTO-ENTMCNC: 33.9 G/DL (ref 31–36)
MCV RBC AUTO: 90.7 FL (ref 80–100)
MONOCYTES ABSOLUTE: 0.6 K/UL (ref 0–1.3)
MONOCYTES RELATIVE PERCENT: 10.2 %
NEUTROPHILS ABSOLUTE: 4.2 K/UL (ref 1.7–7.7)
NEUTROPHILS RELATIVE PERCENT: 71.4 %
PDW BLD-RTO: 12.9 % (ref 12.4–15.4)
PLATELET # BLD: 168 K/UL (ref 135–450)
PMV BLD AUTO: 9.8 FL (ref 5–10.5)
RBC # BLD: 5.1 M/UL (ref 4.2–5.9)
TOTAL IRON BINDING CAPACITY: 366 UG/DL (ref 260–445)
WBC # BLD: 5.9 K/UL (ref 4–11)

## 2019-10-24 PROCEDURE — 85025 COMPLETE CBC W/AUTO DIFF WBC: CPT

## 2019-10-24 PROCEDURE — 83036 HEMOGLOBIN GLYCOSYLATED A1C: CPT

## 2019-10-24 PROCEDURE — 36415 COLL VENOUS BLD VENIPUNCTURE: CPT

## 2019-10-24 PROCEDURE — 83540 ASSAY OF IRON: CPT

## 2019-10-24 PROCEDURE — 83550 IRON BINDING TEST: CPT

## 2019-10-24 PROCEDURE — 99214 OFFICE O/P EST MOD 30 MIN: CPT | Performed by: INTERNAL MEDICINE

## 2019-10-25 LAB
ESTIMATED AVERAGE GLUCOSE: 119.8 MG/DL
HBA1C MFR BLD: 5.8 %

## 2019-11-06 ENCOUNTER — ANESTHESIA (OUTPATIENT)
Dept: ENDOSCOPY | Age: 64
End: 2019-11-06
Payer: MEDICAID

## 2019-11-06 ENCOUNTER — HOSPITAL ENCOUNTER (OUTPATIENT)
Age: 64
Setting detail: OUTPATIENT SURGERY
Discharge: HOME | End: 2019-11-06
Attending: INTERNAL MEDICINE | Admitting: INTERNAL MEDICINE
Payer: MEDICAID

## 2019-11-06 ENCOUNTER — ANESTHESIA EVENT (OUTPATIENT)
Dept: ENDOSCOPY | Age: 64
End: 2019-11-06
Payer: MEDICAID

## 2019-11-06 VITALS
BODY MASS INDEX: 36.41 KG/M2 | WEIGHT: 232 LBS | SYSTOLIC BLOOD PRESSURE: 151 MMHG | HEART RATE: 53 BPM | DIASTOLIC BLOOD PRESSURE: 84 MMHG | RESPIRATION RATE: 16 BRPM | TEMPERATURE: 97.6 F | HEIGHT: 67 IN | OXYGEN SATURATION: 99 %

## 2019-11-06 VITALS — DIASTOLIC BLOOD PRESSURE: 74 MMHG | OXYGEN SATURATION: 98 % | SYSTOLIC BLOOD PRESSURE: 104 MMHG

## 2019-11-06 PROCEDURE — 2580000003 HC RX 258: Performed by: ANESTHESIOLOGY

## 2019-11-06 PROCEDURE — 3700000000 HC ANESTHESIA ATTENDED CARE: Performed by: INTERNAL MEDICINE

## 2019-11-06 PROCEDURE — 2580000003 HC RX 258: Performed by: NURSE ANESTHETIST, CERTIFIED REGISTERED

## 2019-11-06 PROCEDURE — 7100000011 HC PHASE II RECOVERY - ADDTL 15 MIN: Performed by: INTERNAL MEDICINE

## 2019-11-06 PROCEDURE — 2709999900 HC NON-CHARGEABLE SUPPLY: Performed by: INTERNAL MEDICINE

## 2019-11-06 PROCEDURE — 2500000003 HC RX 250 WO HCPCS: Performed by: NURSE ANESTHETIST, CERTIFIED REGISTERED

## 2019-11-06 PROCEDURE — 7100000010 HC PHASE II RECOVERY - FIRST 15 MIN: Performed by: INTERNAL MEDICINE

## 2019-11-06 PROCEDURE — 88305 TISSUE EXAM BY PATHOLOGIST: CPT

## 2019-11-06 PROCEDURE — 3609010300 HC COLONOSCOPY W/BIOPSY SINGLE/MULTIPLE: Performed by: INTERNAL MEDICINE

## 2019-11-06 PROCEDURE — 3700000001 HC ADD 15 MINUTES (ANESTHESIA): Performed by: INTERNAL MEDICINE

## 2019-11-06 PROCEDURE — 6360000002 HC RX W HCPCS: Performed by: NURSE ANESTHETIST, CERTIFIED REGISTERED

## 2019-11-06 RX ORDER — SODIUM CHLORIDE, SODIUM LACTATE, POTASSIUM CHLORIDE, CALCIUM CHLORIDE 600; 310; 30; 20 MG/100ML; MG/100ML; MG/100ML; MG/100ML
INJECTION, SOLUTION INTRAVENOUS CONTINUOUS PRN
Status: DISCONTINUED | OUTPATIENT
Start: 2019-11-06 | End: 2019-11-06 | Stop reason: SDUPTHER

## 2019-11-06 RX ORDER — DIPHENHYDRAMINE HYDROCHLORIDE 50 MG/ML
12.5 INJECTION INTRAMUSCULAR; INTRAVENOUS
Status: DISCONTINUED | OUTPATIENT
Start: 2019-11-06 | End: 2019-11-06 | Stop reason: HOSPADM

## 2019-11-06 RX ORDER — LABETALOL HYDROCHLORIDE 5 MG/ML
5 INJECTION, SOLUTION INTRAVENOUS EVERY 10 MIN PRN
Status: DISCONTINUED | OUTPATIENT
Start: 2019-11-06 | End: 2019-11-06 | Stop reason: HOSPADM

## 2019-11-06 RX ORDER — HYDRALAZINE HYDROCHLORIDE 20 MG/ML
5 INJECTION INTRAMUSCULAR; INTRAVENOUS EVERY 10 MIN PRN
Status: DISCONTINUED | OUTPATIENT
Start: 2019-11-06 | End: 2019-11-06 | Stop reason: HOSPADM

## 2019-11-06 RX ORDER — LIDOCAINE HYDROCHLORIDE 10 MG/ML
0.3 INJECTION, SOLUTION EPIDURAL; INFILTRATION; INTRACAUDAL; PERINEURAL
Status: DISCONTINUED | OUTPATIENT
Start: 2019-11-06 | End: 2019-11-06 | Stop reason: HOSPADM

## 2019-11-06 RX ORDER — SODIUM CHLORIDE 0.9 % (FLUSH) 0.9 %
10 SYRINGE (ML) INJECTION EVERY 12 HOURS SCHEDULED
Status: DISCONTINUED | OUTPATIENT
Start: 2019-11-06 | End: 2019-11-06 | Stop reason: HOSPADM

## 2019-11-06 RX ORDER — SODIUM CHLORIDE, SODIUM LACTATE, POTASSIUM CHLORIDE, CALCIUM CHLORIDE 600; 310; 30; 20 MG/100ML; MG/100ML; MG/100ML; MG/100ML
INJECTION, SOLUTION INTRAVENOUS CONTINUOUS
Status: DISCONTINUED | OUTPATIENT
Start: 2019-11-06 | End: 2019-11-06 | Stop reason: HOSPADM

## 2019-11-06 RX ORDER — PROMETHAZINE HYDROCHLORIDE 25 MG/ML
6.25 INJECTION, SOLUTION INTRAMUSCULAR; INTRAVENOUS
Status: DISCONTINUED | OUTPATIENT
Start: 2019-11-06 | End: 2019-11-06 | Stop reason: HOSPADM

## 2019-11-06 RX ORDER — OXYCODONE HYDROCHLORIDE AND ACETAMINOPHEN 5; 325 MG/1; MG/1
2 TABLET ORAL PRN
Status: DISCONTINUED | OUTPATIENT
Start: 2019-11-06 | End: 2019-11-06 | Stop reason: HOSPADM

## 2019-11-06 RX ORDER — OXYCODONE HYDROCHLORIDE AND ACETAMINOPHEN 5; 325 MG/1; MG/1
1 TABLET ORAL PRN
Status: DISCONTINUED | OUTPATIENT
Start: 2019-11-06 | End: 2019-11-06 | Stop reason: HOSPADM

## 2019-11-06 RX ORDER — SODIUM CHLORIDE 0.9 % (FLUSH) 0.9 %
10 SYRINGE (ML) INJECTION PRN
Status: DISCONTINUED | OUTPATIENT
Start: 2019-11-06 | End: 2019-11-06 | Stop reason: HOSPADM

## 2019-11-06 RX ORDER — LIDOCAINE HYDROCHLORIDE 20 MG/ML
INJECTION, SOLUTION INFILTRATION; PERINEURAL PRN
Status: DISCONTINUED | OUTPATIENT
Start: 2019-11-06 | End: 2019-11-06 | Stop reason: SDUPTHER

## 2019-11-06 RX ORDER — PROPOFOL 10 MG/ML
INJECTION, EMULSION INTRAVENOUS PRN
Status: DISCONTINUED | OUTPATIENT
Start: 2019-11-06 | End: 2019-11-06 | Stop reason: SDUPTHER

## 2019-11-06 RX ORDER — MORPHINE SULFATE 2 MG/ML
1 INJECTION, SOLUTION INTRAMUSCULAR; INTRAVENOUS EVERY 5 MIN PRN
Status: DISCONTINUED | OUTPATIENT
Start: 2019-11-06 | End: 2019-11-06 | Stop reason: HOSPADM

## 2019-11-06 RX ORDER — PROPOFOL 10 MG/ML
INJECTION, EMULSION INTRAVENOUS CONTINUOUS PRN
Status: DISCONTINUED | OUTPATIENT
Start: 2019-11-06 | End: 2019-11-06 | Stop reason: SDUPTHER

## 2019-11-06 RX ORDER — ONDANSETRON 2 MG/ML
4 INJECTION INTRAMUSCULAR; INTRAVENOUS PRN
Status: DISCONTINUED | OUTPATIENT
Start: 2019-11-06 | End: 2019-11-06 | Stop reason: HOSPADM

## 2019-11-06 RX ORDER — MORPHINE SULFATE 2 MG/ML
2 INJECTION, SOLUTION INTRAMUSCULAR; INTRAVENOUS EVERY 5 MIN PRN
Status: DISCONTINUED | OUTPATIENT
Start: 2019-11-06 | End: 2019-11-06 | Stop reason: HOSPADM

## 2019-11-06 RX ADMIN — SODIUM CHLORIDE, POTASSIUM CHLORIDE, SODIUM LACTATE AND CALCIUM CHLORIDE: 600; 310; 30; 20 INJECTION, SOLUTION INTRAVENOUS at 10:59

## 2019-11-06 RX ADMIN — PROPOFOL 100 MG: 10 INJECTION, EMULSION INTRAVENOUS at 11:02

## 2019-11-06 RX ADMIN — PROPOFOL 50 MG: 10 INJECTION, EMULSION INTRAVENOUS at 11:09

## 2019-11-06 RX ADMIN — PROPOFOL 50 MG: 10 INJECTION, EMULSION INTRAVENOUS at 11:13

## 2019-11-06 RX ADMIN — SODIUM CHLORIDE, POTASSIUM CHLORIDE, SODIUM LACTATE AND CALCIUM CHLORIDE: 600; 310; 30; 20 INJECTION, SOLUTION INTRAVENOUS at 10:07

## 2019-11-06 RX ADMIN — PROPOFOL 50 MG: 10 INJECTION, EMULSION INTRAVENOUS at 11:18

## 2019-11-06 RX ADMIN — LIDOCAINE HYDROCHLORIDE 60 MG: 20 INJECTION, SOLUTION INFILTRATION; PERINEURAL at 11:02

## 2019-11-06 RX ADMIN — PROPOFOL 50 MG: 10 INJECTION, EMULSION INTRAVENOUS at 11:05

## 2019-11-06 ASSESSMENT — ENCOUNTER SYMPTOMS: SHORTNESS OF BREATH: 1

## 2019-11-06 ASSESSMENT — PAIN - FUNCTIONAL ASSESSMENT: PAIN_FUNCTIONAL_ASSESSMENT: 0-10

## 2019-11-07 RX ORDER — ALBUTEROL SULFATE 90 UG/1
2 AEROSOL, METERED RESPIRATORY (INHALATION) EVERY 6 HOURS PRN
Qty: 1 INHALER | Refills: 3 | OUTPATIENT
Start: 2019-11-07 | End: 2020-03-24 | Stop reason: SDUPTHER

## 2019-11-07 RX ORDER — FLUTICASONE FUROATE AND VILANTEROL 200; 25 UG/1; UG/1
POWDER RESPIRATORY (INHALATION)
Qty: 1 EACH | Refills: 3 | OUTPATIENT
Start: 2019-11-07

## 2019-11-18 RX ORDER — AMLODIPINE BESYLATE 10 MG/1
TABLET ORAL
Qty: 30 TABLET | Refills: 5 | Status: SHIPPED | OUTPATIENT
Start: 2019-11-18 | End: 2020-05-14

## 2019-11-20 ENCOUNTER — OFFICE VISIT (OUTPATIENT)
Dept: INTERNAL MEDICINE CLINIC | Age: 64
End: 2019-11-20

## 2019-11-20 VITALS
DIASTOLIC BLOOD PRESSURE: 96 MMHG | HEART RATE: 87 BPM | BODY MASS INDEX: 36.02 KG/M2 | SYSTOLIC BLOOD PRESSURE: 176 MMHG | WEIGHT: 230 LBS

## 2019-11-20 DIAGNOSIS — M20.5X1 HALLUX LIMITUS OF RIGHT FOOT: Primary | ICD-10-CM

## 2019-11-20 PROCEDURE — 99203 OFFICE O/P NEW LOW 30 MIN: CPT | Performed by: PODIATRIST

## 2020-01-08 ENCOUNTER — OFFICE VISIT (OUTPATIENT)
Dept: INTERNAL MEDICINE CLINIC | Age: 65
End: 2020-01-08

## 2020-01-08 VITALS
BODY MASS INDEX: 34.93 KG/M2 | WEIGHT: 223 LBS | HEART RATE: 74 BPM | DIASTOLIC BLOOD PRESSURE: 82 MMHG | SYSTOLIC BLOOD PRESSURE: 140 MMHG | TEMPERATURE: 98.5 F | OXYGEN SATURATION: 95 %

## 2020-01-08 PROCEDURE — 99214 OFFICE O/P EST MOD 30 MIN: CPT | Performed by: INTERNAL MEDICINE

## 2020-01-08 PROCEDURE — 93000 ELECTROCARDIOGRAM COMPLETE: CPT | Performed by: INTERNAL MEDICINE

## 2020-01-08 ASSESSMENT — ENCOUNTER SYMPTOMS
COUGH: 1
DIARRHEA: 1
SHORTNESS OF BREATH: 0

## 2020-01-14 RX ORDER — LEVETIRACETAM 500 MG/1
TABLET ORAL
Qty: 60 TABLET | Refills: 3 | Status: SHIPPED | OUTPATIENT
Start: 2020-01-14 | End: 2020-05-14

## 2020-01-30 ENCOUNTER — ANESTHESIA EVENT (OUTPATIENT)
Dept: OPERATING ROOM | Age: 65
End: 2020-01-30
Payer: MEDICAID

## 2020-01-31 ENCOUNTER — ANESTHESIA (OUTPATIENT)
Dept: OPERATING ROOM | Age: 65
End: 2020-01-31
Payer: MEDICAID

## 2020-01-31 ENCOUNTER — HOSPITAL ENCOUNTER (OUTPATIENT)
Age: 65
Setting detail: OUTPATIENT SURGERY
Discharge: HOME | End: 2020-01-31
Attending: PODIATRIST | Admitting: PODIATRIST
Payer: MEDICAID

## 2020-01-31 VITALS
DIASTOLIC BLOOD PRESSURE: 79 MMHG | SYSTOLIC BLOOD PRESSURE: 165 MMHG | OXYGEN SATURATION: 95 % | HEIGHT: 64 IN | TEMPERATURE: 96.8 F | BODY MASS INDEX: 38.41 KG/M2 | HEART RATE: 56 BPM | WEIGHT: 225 LBS | RESPIRATION RATE: 20 BRPM

## 2020-01-31 VITALS
OXYGEN SATURATION: 96 % | DIASTOLIC BLOOD PRESSURE: 71 MMHG | RESPIRATION RATE: 2 BRPM | SYSTOLIC BLOOD PRESSURE: 113 MMHG

## 2020-01-31 PROCEDURE — 2580000003 HC RX 258: Performed by: ANESTHESIOLOGY

## 2020-01-31 PROCEDURE — 7100000010 HC PHASE II RECOVERY - FIRST 15 MIN: Performed by: PODIATRIST

## 2020-01-31 PROCEDURE — 7100000011 HC PHASE II RECOVERY - ADDTL 15 MIN: Performed by: PODIATRIST

## 2020-01-31 PROCEDURE — C1769 GUIDE WIRE: HCPCS | Performed by: PODIATRIST

## 2020-01-31 PROCEDURE — 6360000002 HC RX W HCPCS: Performed by: NURSE ANESTHETIST, CERTIFIED REGISTERED

## 2020-01-31 PROCEDURE — 7100000000 HC PACU RECOVERY - FIRST 15 MIN: Performed by: PODIATRIST

## 2020-01-31 PROCEDURE — 6360000002 HC RX W HCPCS: Performed by: PODIATRIST

## 2020-01-31 PROCEDURE — 3700000001 HC ADD 15 MINUTES (ANESTHESIA): Performed by: PODIATRIST

## 2020-01-31 PROCEDURE — 3600000014 HC SURGERY LEVEL 4 ADDTL 15MIN: Performed by: PODIATRIST

## 2020-01-31 PROCEDURE — 3600000004 HC SURGERY LEVEL 4 BASE: Performed by: PODIATRIST

## 2020-01-31 PROCEDURE — 2500000003 HC RX 250 WO HCPCS: Performed by: NURSE ANESTHETIST, CERTIFIED REGISTERED

## 2020-01-31 PROCEDURE — C1900 LEAD, CORONARY VENOUS: HCPCS | Performed by: PODIATRIST

## 2020-01-31 PROCEDURE — 3700000000 HC ANESTHESIA ATTENDED CARE: Performed by: PODIATRIST

## 2020-01-31 PROCEDURE — C1713 ANCHOR/SCREW BN/BN,TIS/BN: HCPCS | Performed by: PODIATRIST

## 2020-01-31 PROCEDURE — 7100000001 HC PACU RECOVERY - ADDTL 15 MIN: Performed by: PODIATRIST

## 2020-01-31 PROCEDURE — 2500000003 HC RX 250 WO HCPCS: Performed by: PODIATRIST

## 2020-01-31 PROCEDURE — 2709999900 HC NON-CHARGEABLE SUPPLY: Performed by: PODIATRIST

## 2020-01-31 PROCEDURE — 2580000003 HC RX 258: Performed by: PODIATRIST

## 2020-01-31 PROCEDURE — 2720000010 HC SURG SUPPLY STERILE: Performed by: PODIATRIST

## 2020-01-31 DEVICE — MTP FUSION PLATE, SLIM, RIGHT
Type: IMPLANTABLE DEVICE | Status: FUNCTIONAL
Brand: OMNI

## 2020-01-31 DEVICE — NON-LOCKING SCREW - 3.5 X 16MM
Type: IMPLANTABLE DEVICE | Status: FUNCTIONAL
Brand: OMNI

## 2020-01-31 DEVICE — LOCKING SCREW - 3.5 X 26MM
Type: IMPLANTABLE DEVICE | Status: FUNCTIONAL
Brand: OMNI

## 2020-01-31 DEVICE — LOCKING SCREW - 3.5 X 16MM
Type: IMPLANTABLE DEVICE | Status: FUNCTIONAL
Brand: OMNI

## 2020-01-31 DEVICE — BIT DRILL CANN 2.5MM: Type: IMPLANTABLE DEVICE | Status: FUNCTIONAL

## 2020-01-31 DEVICE — PARTIALLY THREADED CANNULATED SCREW - 3.5 X 34MM
Type: IMPLANTABLE DEVICE | Status: FUNCTIONAL
Brand: OMNI

## 2020-01-31 DEVICE — LOCKING SCREW - 3.5 X 20MM
Type: IMPLANTABLE DEVICE | Status: FUNCTIONAL
Brand: OMNI

## 2020-01-31 DEVICE — LOCKING SCREW - 3.5 X 18MM
Type: IMPLANTABLE DEVICE | Status: FUNCTIONAL
Brand: OMNI

## 2020-01-31 RX ORDER — MEPERIDINE HYDROCHLORIDE 50 MG/ML
12.5 INJECTION INTRAMUSCULAR; INTRAVENOUS; SUBCUTANEOUS EVERY 5 MIN PRN
Status: DISCONTINUED | OUTPATIENT
Start: 2020-01-31 | End: 2020-01-31 | Stop reason: HOSPADM

## 2020-01-31 RX ORDER — LABETALOL HYDROCHLORIDE 5 MG/ML
5 INJECTION, SOLUTION INTRAVENOUS EVERY 10 MIN PRN
Status: DISCONTINUED | OUTPATIENT
Start: 2020-01-31 | End: 2020-01-31 | Stop reason: HOSPADM

## 2020-01-31 RX ORDER — OXYCODONE HYDROCHLORIDE AND ACETAMINOPHEN 5; 325 MG/1; MG/1
1 TABLET ORAL PRN
Status: DISCONTINUED | OUTPATIENT
Start: 2020-01-31 | End: 2020-01-31 | Stop reason: HOSPADM

## 2020-01-31 RX ORDER — ONDANSETRON 2 MG/ML
INJECTION INTRAMUSCULAR; INTRAVENOUS PRN
Status: DISCONTINUED | OUTPATIENT
Start: 2020-01-31 | End: 2020-01-31 | Stop reason: SDUPTHER

## 2020-01-31 RX ORDER — DEXAMETHASONE SODIUM PHOSPHATE 10 MG/ML
INJECTION INTRAMUSCULAR; INTRAVENOUS PRN
Status: DISCONTINUED | OUTPATIENT
Start: 2020-01-31 | End: 2020-01-31 | Stop reason: SDUPTHER

## 2020-01-31 RX ORDER — SODIUM CHLORIDE, SODIUM LACTATE, POTASSIUM CHLORIDE, CALCIUM CHLORIDE 600; 310; 30; 20 MG/100ML; MG/100ML; MG/100ML; MG/100ML
INJECTION, SOLUTION INTRAVENOUS CONTINUOUS
Status: DISCONTINUED | OUTPATIENT
Start: 2020-01-31 | End: 2020-01-31 | Stop reason: HOSPADM

## 2020-01-31 RX ORDER — DIPHENHYDRAMINE HYDROCHLORIDE 50 MG/ML
12.5 INJECTION INTRAMUSCULAR; INTRAVENOUS
Status: DISCONTINUED | OUTPATIENT
Start: 2020-01-31 | End: 2020-01-31 | Stop reason: HOSPADM

## 2020-01-31 RX ORDER — HYDRALAZINE HYDROCHLORIDE 20 MG/ML
5 INJECTION INTRAMUSCULAR; INTRAVENOUS EVERY 10 MIN PRN
Status: DISCONTINUED | OUTPATIENT
Start: 2020-01-31 | End: 2020-01-31 | Stop reason: HOSPADM

## 2020-01-31 RX ORDER — PROPOFOL 10 MG/ML
INJECTION, EMULSION INTRAVENOUS PRN
Status: DISCONTINUED | OUTPATIENT
Start: 2020-01-31 | End: 2020-01-31 | Stop reason: SDUPTHER

## 2020-01-31 RX ORDER — FENTANYL CITRATE 50 UG/ML
INJECTION, SOLUTION INTRAMUSCULAR; INTRAVENOUS PRN
Status: DISCONTINUED | OUTPATIENT
Start: 2020-01-31 | End: 2020-01-31 | Stop reason: SDUPTHER

## 2020-01-31 RX ORDER — OXYCODONE HYDROCHLORIDE AND ACETAMINOPHEN 5; 325 MG/1; MG/1
2 TABLET ORAL PRN
Status: DISCONTINUED | OUTPATIENT
Start: 2020-01-31 | End: 2020-01-31 | Stop reason: HOSPADM

## 2020-01-31 RX ORDER — SODIUM CHLORIDE 0.9 % (FLUSH) 0.9 %
10 SYRINGE (ML) INJECTION PRN
Status: DISCONTINUED | OUTPATIENT
Start: 2020-01-31 | End: 2020-01-31 | Stop reason: HOSPADM

## 2020-01-31 RX ORDER — LIDOCAINE HYDROCHLORIDE 10 MG/ML
0.3 INJECTION, SOLUTION EPIDURAL; INFILTRATION; INTRACAUDAL; PERINEURAL
Status: DISCONTINUED | OUTPATIENT
Start: 2020-01-31 | End: 2020-01-31 | Stop reason: HOSPADM

## 2020-01-31 RX ORDER — LIDOCAINE HYDROCHLORIDE 20 MG/ML
INJECTION, SOLUTION INFILTRATION; PERINEURAL PRN
Status: DISCONTINUED | OUTPATIENT
Start: 2020-01-31 | End: 2020-01-31 | Stop reason: SDUPTHER

## 2020-01-31 RX ORDER — MIDAZOLAM HYDROCHLORIDE 1 MG/ML
INJECTION INTRAMUSCULAR; INTRAVENOUS PRN
Status: DISCONTINUED | OUTPATIENT
Start: 2020-01-31 | End: 2020-01-31 | Stop reason: SDUPTHER

## 2020-01-31 RX ORDER — MORPHINE SULFATE 2 MG/ML
2 INJECTION, SOLUTION INTRAMUSCULAR; INTRAVENOUS EVERY 5 MIN PRN
Status: DISCONTINUED | OUTPATIENT
Start: 2020-01-31 | End: 2020-01-31 | Stop reason: HOSPADM

## 2020-01-31 RX ORDER — OXYCODONE HYDROCHLORIDE AND ACETAMINOPHEN 5; 325 MG/1; MG/1
1 TABLET ORAL EVERY 4 HOURS PRN
Qty: 30 TABLET | Refills: 0 | Status: SHIPPED | OUTPATIENT
Start: 2020-01-31 | End: 2020-02-07

## 2020-01-31 RX ORDER — PROMETHAZINE HYDROCHLORIDE 25 MG/ML
6.25 INJECTION, SOLUTION INTRAMUSCULAR; INTRAVENOUS
Status: DISCONTINUED | OUTPATIENT
Start: 2020-01-31 | End: 2020-01-31 | Stop reason: HOSPADM

## 2020-01-31 RX ORDER — ONDANSETRON 2 MG/ML
4 INJECTION INTRAMUSCULAR; INTRAVENOUS
Status: DISCONTINUED | OUTPATIENT
Start: 2020-01-31 | End: 2020-01-31 | Stop reason: HOSPADM

## 2020-01-31 RX ORDER — BUPIVACAINE HYDROCHLORIDE 5 MG/ML
INJECTION, SOLUTION EPIDURAL; INTRACAUDAL PRN
Status: DISCONTINUED | OUTPATIENT
Start: 2020-01-31 | End: 2020-01-31 | Stop reason: ALTCHOICE

## 2020-01-31 RX ORDER — SODIUM CHLORIDE 0.9 % (FLUSH) 0.9 %
10 SYRINGE (ML) INJECTION EVERY 12 HOURS SCHEDULED
Status: DISCONTINUED | OUTPATIENT
Start: 2020-01-31 | End: 2020-01-31 | Stop reason: HOSPADM

## 2020-01-31 RX ORDER — MAGNESIUM HYDROXIDE 1200 MG/15ML
LIQUID ORAL CONTINUOUS PRN
Status: COMPLETED | OUTPATIENT
Start: 2020-01-31 | End: 2020-01-31

## 2020-01-31 RX ORDER — MORPHINE SULFATE 2 MG/ML
1 INJECTION, SOLUTION INTRAMUSCULAR; INTRAVENOUS EVERY 5 MIN PRN
Status: DISCONTINUED | OUTPATIENT
Start: 2020-01-31 | End: 2020-01-31 | Stop reason: HOSPADM

## 2020-01-31 RX ADMIN — SODIUM CHLORIDE, POTASSIUM CHLORIDE, SODIUM LACTATE AND CALCIUM CHLORIDE: 600; 310; 30; 20 INJECTION, SOLUTION INTRAVENOUS at 08:33

## 2020-01-31 RX ADMIN — CEFAZOLIN 2 G: 1 INJECTION, POWDER, FOR SOLUTION INTRAMUSCULAR; INTRAVENOUS at 07:13

## 2020-01-31 RX ADMIN — SODIUM CHLORIDE, POTASSIUM CHLORIDE, SODIUM LACTATE AND CALCIUM CHLORIDE: 600; 310; 30; 20 INJECTION, SOLUTION INTRAVENOUS at 06:58

## 2020-01-31 RX ADMIN — ONDANSETRON 4 MG: 2 INJECTION INTRAMUSCULAR; INTRAVENOUS at 07:35

## 2020-01-31 RX ADMIN — MIDAZOLAM HYDROCHLORIDE 2 MG: 2 INJECTION, SOLUTION INTRAMUSCULAR; INTRAVENOUS at 07:13

## 2020-01-31 RX ADMIN — FENTANYL CITRATE 100 MCG: 50 INJECTION INTRAMUSCULAR; INTRAVENOUS at 07:23

## 2020-01-31 RX ADMIN — PROPOFOL 200 MG: 10 INJECTION, EMULSION INTRAVENOUS at 07:23

## 2020-01-31 RX ADMIN — DEXAMETHASONE SODIUM PHOSPHATE 10 MG: 10 INJECTION INTRAMUSCULAR; INTRAVENOUS at 07:36

## 2020-01-31 RX ADMIN — LIDOCAINE HYDROCHLORIDE 50 MG: 20 INJECTION, SOLUTION INFILTRATION; PERINEURAL at 07:23

## 2020-01-31 ASSESSMENT — PULMONARY FUNCTION TESTS
PIF_VALUE: 17
PIF_VALUE: 11
PIF_VALUE: 12
PIF_VALUE: 11
PIF_VALUE: 1
PIF_VALUE: 1
PIF_VALUE: 11
PIF_VALUE: 18
PIF_VALUE: 11
PIF_VALUE: 12
PIF_VALUE: 14
PIF_VALUE: 2
PIF_VALUE: 11
PIF_VALUE: 18
PIF_VALUE: 11
PIF_VALUE: 11
PIF_VALUE: 22
PIF_VALUE: 0
PIF_VALUE: 15
PIF_VALUE: 11
PIF_VALUE: 12
PIF_VALUE: 12
PIF_VALUE: 1
PIF_VALUE: 10
PIF_VALUE: 11
PIF_VALUE: 12
PIF_VALUE: 12
PIF_VALUE: 11
PIF_VALUE: 12
PIF_VALUE: 12
PIF_VALUE: 11
PIF_VALUE: 18
PIF_VALUE: 17
PIF_VALUE: 12
PIF_VALUE: 16
PIF_VALUE: 11
PIF_VALUE: 12
PIF_VALUE: 11
PIF_VALUE: 13
PIF_VALUE: 11
PIF_VALUE: 9
PIF_VALUE: 11
PIF_VALUE: 14
PIF_VALUE: 17
PIF_VALUE: 11
PIF_VALUE: 14
PIF_VALUE: 12
PIF_VALUE: 11
PIF_VALUE: 17
PIF_VALUE: 11
PIF_VALUE: 11
PIF_VALUE: 18
PIF_VALUE: 10
PIF_VALUE: 14
PIF_VALUE: 15
PIF_VALUE: 11
PIF_VALUE: 41
PIF_VALUE: 16
PIF_VALUE: 11
PIF_VALUE: 17
PIF_VALUE: 11
PIF_VALUE: 10
PIF_VALUE: 12
PIF_VALUE: 11
PIF_VALUE: 11
PIF_VALUE: 12
PIF_VALUE: 10
PIF_VALUE: 11
PIF_VALUE: 11
PIF_VALUE: 19
PIF_VALUE: 16
PIF_VALUE: 1
PIF_VALUE: 8
PIF_VALUE: 12
PIF_VALUE: 10
PIF_VALUE: 10
PIF_VALUE: 11
PIF_VALUE: 9
PIF_VALUE: 11
PIF_VALUE: 12
PIF_VALUE: 18
PIF_VALUE: 18
PIF_VALUE: 12
PIF_VALUE: 11
PIF_VALUE: 11
PIF_VALUE: 18
PIF_VALUE: 14
PIF_VALUE: 18
PIF_VALUE: 11
PIF_VALUE: 24
PIF_VALUE: 11
PIF_VALUE: 12
PIF_VALUE: 11
PIF_VALUE: 11
PIF_VALUE: 10
PIF_VALUE: 1
PIF_VALUE: 12
PIF_VALUE: 12
PIF_VALUE: 11
PIF_VALUE: 11
PIF_VALUE: 16
PIF_VALUE: 11
PIF_VALUE: 18
PIF_VALUE: 12
PIF_VALUE: 27
PIF_VALUE: 14
PIF_VALUE: 11
PIF_VALUE: 18
PIF_VALUE: 11
PIF_VALUE: 12
PIF_VALUE: 10
PIF_VALUE: 10
PIF_VALUE: 11
PIF_VALUE: 21
PIF_VALUE: 12
PIF_VALUE: 11
PIF_VALUE: 2

## 2020-01-31 ASSESSMENT — PAIN SCALES - GENERAL
PAINLEVEL_OUTOF10: 3
PAINLEVEL_OUTOF10: 2

## 2020-01-31 ASSESSMENT — ENCOUNTER SYMPTOMS: SHORTNESS OF BREATH: 1

## 2020-01-31 ASSESSMENT — PAIN - FUNCTIONAL ASSESSMENT: PAIN_FUNCTIONAL_ASSESSMENT: 0-10

## 2020-01-31 NOTE — H&P
I have reviewed the patient's H&P. I agree with the findings. Will proceed with surgery.     Nuha Vasques, [address], [address], [address]  Office: [phone]  Mobile: [phone]

## 2020-01-31 NOTE — PROGRESS NOTES
pt's girlfriend at bedside/ brought back to PACU after having staff call back from waiting room (4times in 10 minutes) if she can come back stating \"I know he is back there\"   Girlfriend asking patient  as he is waking up\"  which nurse do you want to take home, I know you like blonds - like this nurse don't you! \"- when he doesn't answer she asked him 5 more times - then proceeds at to tell me she \"would punch anyone in the face if they looked at him \"this nurse instructed pts girl friend that this is not appropriate  and he is just  to waking  Up from anesthesia   Pt's girlfriend is sitting in the bed with him rubbing his forehead--

## 2020-01-31 NOTE — BRIEF OP NOTE
Brief Postoperative Note  ______________________________________________________________    Patient: Raghu Horton  YOB: 1955  MRN: [id number]  Date of Procedure: 1/31/2020    Pre-Op Diagnosis: Hallux rigidus of right foot [M20.21]    Post-Op Diagnosis: Same       Procedure(s):  ARTHRODESIS METATARSOPHALANGEAL JOINT RIGHT GREAT TOE    Anesthesia: Monitor Anesthesia Care    Surgeon(s):  Keely Alonso DPM    Assistant: Joy Uribe, PGY3    Estimated Blood Loss (mL): less than 50     Complications: None    Specimens:   * No specimens in log *    Implants:  Implant Name Type Inv.  Item Serial No.  Lot No. LRB No. Used   3.5x18mm lockng screw      Right 1   3.5x26mm locking screw      Right 1   3.5mm x 16 nonlocking screw      Right 1   3.5x34mm cannulated screw      Right 1         Drains: * No LDAs found *    Findings: Consistent with diagnosis    Keely Alonso DPM  Date: 1/31/2020  Time: 9:12 AM

## 2020-01-31 NOTE — ANESTHESIA PRE PROCEDURE
Reported on 1/8/2020 8/21/16   Humza Sultana MD       Current medications:    Current Facility-Administered Medications   Medication Dose Route Frequency Provider Last Rate Last Dose    ceFAZolin (ANCEF) 2 g in dextrose 5 % 100 mL IVPB  2 g Intravenous On Call to Jourdan Meadows DPM        lactated ringers infusion   Intravenous Continuous Nicole Gillespie  mL/hr at 01/31/20 0658      sodium chloride flush 0.9 % injection 10 mL  10 mL Intravenous 2 times per day Nicole Gillespie MD        sodium chloride flush 0.9 % injection 10 mL  10 mL Intravenous PRN Nicole Gillespie MD        lidocaine PF 1 % injection 0.3 mL  0.3 mL Intradermal Once PRN Nicole Gillespie MD           Allergies: Allergies   Allergen Reactions    Gadolinium Derivatives Anaphylaxis    Iodinated Diagnostic Agents Hives, Shortness Of Breath, Itching and Swelling    Ace Inhibitors      Other reaction(s): cough    Other        Problem List:    Patient Active Problem List   Diagnosis Code    Syncope and collapse R55    Hypertensive emergency I16.1    ETOH abuse F10.10    TIA (transient ischemic attack) G45.9    HLD (hyperlipidemia) E78.5    Hx of completed stroke Z86.73    HTN (hypertension) I10    Encephalopathy, hypertensive I67.4    Remote history of stroke Z80.78    New persistent daily headache G44.52    Nonintractable headache R51    Chest pain R07.9    Acute CHF (congestive heart failure) (HCC) I50.9    Acute combined systolic and diastolic CHF, NYHA class 3 (HCC) I50.41    Pleural effusion J90    Shortness of breath R06.02    Cerebrovascular accident (CVA) determined by clinical assessment ([address] I63.9    Asthma J45.909    New onset seizure ([address] R56.9    Numbness and tingling R20.0, R20.2    Epilepsy, focal ([address] G40.109    HTN (hypertension), benign I10    Foreign body (FB) in soft tissue M79.5    Penetrating trauma T14. 8XXA    Right leg swelling M79.89    Flat foot M21.40    Peroneal tendinitis of right lower extremity M76.71    Right ankle swelling M25.471    Acute right ankle pain M25.571    Sprain of calcaneofibular ligament of right ankle S93.411A    Sprain of deltoid ligament of right ankle S93.421A       Past Medical History:        Diagnosis Date    [facility]    cerebral aneurysm - stated    Arthritis     Asthma     Cerebral artery occlusion with cerebral infarction ([facility]     History of hepatitis C 2009    Recieved anti-viral therapy \"shots in the belly\" ; 05/09/18 HCV RNA Not Detected    Hyperlipidemia     Hypertension     Seizure disorder [facility])     states last seizure was 9/2019       Past Surgical History:        Procedure Laterality Date    COLONOSCOPY  2016 08/13/19 Patient relates that he has never had colonoscopy    COLONOSCOPY N/A 11/6/2019    COLONOSCOPY WITH BIOPSY performed by Shantanu Rodriguez MD at [address] SFT TISS HAND/FNGR SUBQ 1.5+CM Right 10/3/2018    WOUND EXPLORATION, REMOVAL DEEP FOREIGN BODY RIGHT HAND performed by Izabella Renee MD at [place] [address] History:    Social History     Tobacco Use    Smoking status: Never Smoker    Smokeless tobacco: Never Used   Substance Use Topics    Alcohol use:  Yes     Alcohol/week: 42.0 standard drinks     Types: 42 Cans of beer per week                                Counseling given: Not Answered      Vital Signs (Current):   Vitals:    01/24/20 1026 01/31/20 0637   BP:  132/89   Pulse:  63   Resp:  16   Temp:  97 °F (36.1 °C)   TempSrc:  Temporal   SpO2:  96%   Weight: 225 lb (102.1 kg) 225 lb (102.1 kg)   Height: 5' 4\" (1.626 m) 5' 4\" (1.626 m)                                              BP Readings from Last 3 Encounters:   01/31/20 132/89   01/08/20 (!) 140/82   11/20/19 (!) 176/96       NPO Status: Time of last liquid consumption: 2100                        Time of last solid consumption: 1700 Anesthesia Plan      MAC     ASA 3     (Risks, benefits and alternatives of MAC anesthesia discussed with pt. Questions answered. Willing to proceed.)  Induction: intravenous. Anesthetic plan and risks discussed with patient and spouse.                       Jennifer Bacon MD   1/31/2020

## 2020-02-01 NOTE — OP NOTE
patient's well-padded  right calf. A local anesthetic block consisting of 10 mL of 1:1, 0.5%  Marcaine plain and 2.0% lidocaine plain was injected proximal to the  surgical site. The right lower extremity was scrubbed, prepped, and  draped in the usual sterile fashion. An Esmarch bandage was used to  exsanguinate the patient's right lower extremity and the tourniquet was  inflated to 250 mmHg. Attention was then directed to the right first  metatarsophalangeal joint. An incision was made using a #15 blade. Over that joint, the incision was approximately 6 cm in length. Incision was carried down to the subcutaneous tissue. Care was taken to  avoid injury to neurovascular structures at the level of the capsule and  periosteum was reached. At that point, a deep capsular and periosteal  incision was made. The capsule and periosteum were reflected medially  and laterally exposing the underlying joint, which showed a significant  amount of periarticular osteophyte formation and destruction of the  joint itself with minimal cartilage remaining at the head of the first  metatarsal.  A rongeur was used to remove _____ formation. Then, a cup  and cone reamer type system was used to prepare the head of the first  metatarsals as well as the base of the proximal phalanx. A K wire was  used to drill into the first metatarsal head and the base of the  proximal phalanx to prepare the joint. The site was irrigated with  copious amounts of sterile saline. Then, a cannulated 3.5 screw was  placed across the joint. Intraoperative C-arm fluoroscopy was used to  help confirm alignment. Excellent alignment was noted. Excellent  compression was noted. Then, a plate with associated locking and  nonlocking screws was placed over the first metatarsophalangeal joint. Again, intraoperative C-arm fluoroscopy was used to help confirm  alignment. Excellent alignment was noted.   The site was irrigated with  copious amounts of sterile saline. The capsular and periosteal  structures were closed with 3-0 Vicryl. The subcutaneous tissue was  closed with 4-0 Vicryl and the subcuticular tissue was closed with 4-0  Monocryl. The tourniquet was let down with the prompt and hyperemic  response noted at all digits on the right. Sterile bandage of Steri's,  4 x 4's, Kerlix, Coban, and a posterior splint was applied. The patient  tolerated the procedure and the anesthesia well. He was transported  from the operating room to the PACU with the vital signs stable and  vascular status intact to all aspects of the right foot. Following a  period of postoperative monitoring, he will be discharged to home with  written and oral instructions per myself. Follow up with me in the  office next week. I asked him to call the office sooner with any  problems, questions, or concerns.         Florencio Tang DPM    D: 02/01/2020 9:38:14       T: 02/01/2020 11:51:24     [email or web address]  Job#: [id number]     Doc#: [id number]    CC:

## 2020-02-17 RX ORDER — LOSARTAN POTASSIUM 100 MG/1
TABLET ORAL
Qty: 30 TABLET | Refills: 5 | Status: SHIPPED | OUTPATIENT
Start: 2020-02-17

## 2020-03-23 ENCOUNTER — HOSPITAL ENCOUNTER (EMERGENCY)
Age: 65
Discharge: HOME | End: 2020-03-23
Payer: MEDICAID

## 2020-03-23 ENCOUNTER — APPOINTMENT (OUTPATIENT)
Dept: GENERAL RADIOLOGY | Age: 65
End: 2020-03-23
Payer: MEDICAID

## 2020-03-23 VITALS
HEART RATE: 88 BPM | RESPIRATION RATE: 20 BRPM | OXYGEN SATURATION: 97 % | TEMPERATURE: 98.6 F | SYSTOLIC BLOOD PRESSURE: 158 MMHG | BODY MASS INDEX: 35.31 KG/M2 | HEIGHT: 67 IN | WEIGHT: 225 LBS | DIASTOLIC BLOOD PRESSURE: 87 MMHG

## 2020-03-23 LAB
A/G RATIO: 1.5 (ref 1.1–2.2)
ALBUMIN SERPL-MCNC: 4.6 G/DL (ref 3.4–5)
ALP BLD-CCNC: 89 U/L (ref 40–129)
ALT SERPL-CCNC: 56 U/L (ref 10–40)
ANION GAP SERPL CALCULATED.3IONS-SCNC: 12 MMOL/L (ref 3–16)
AST SERPL-CCNC: 36 U/L (ref 15–37)
BILIRUB SERPL-MCNC: 0.4 MG/DL (ref 0–1)
BUN BLDV-MCNC: 21 MG/DL (ref 7–20)
CALCIUM SERPL-MCNC: 9.5 MG/DL (ref 8.3–10.6)
CHLORIDE BLD-SCNC: 100 MMOL/L (ref 99–110)
CO2: 25 MMOL/L (ref 21–32)
CREAT SERPL-MCNC: 1.1 MG/DL (ref 0.8–1.3)
GFR AFRICAN AMERICAN: >60
GFR NON-AFRICAN AMERICAN: >60
GLOBULIN: 3.1 G/DL
GLUCOSE BLD-MCNC: 109 MG/DL (ref 70–99)
HCT VFR BLD CALC: 44.5 % (ref 40.5–52.5)
HEMOGLOBIN: 15.4 G/DL (ref 13.5–17.5)
MCH RBC QN AUTO: 31.2 PG (ref 26–34)
MCHC RBC AUTO-ENTMCNC: 34.6 G/DL (ref 31–36)
MCV RBC AUTO: 90.2 FL (ref 80–100)
PDW BLD-RTO: 13.7 % (ref 12.4–15.4)
PLATELET # BLD: 133 K/UL (ref 135–450)
PMV BLD AUTO: 9.6 FL (ref 5–10.5)
POTASSIUM SERPL-SCNC: 4.2 MMOL/L (ref 3.5–5.1)
RAPID INFLUENZA  B AGN: NEGATIVE
RAPID INFLUENZA A AGN: NEGATIVE
RBC # BLD: 4.93 M/UL (ref 4.2–5.9)
S PYO AG THROAT QL: NEGATIVE
SODIUM BLD-SCNC: 137 MMOL/L (ref 136–145)
TOTAL PROTEIN: 7.7 G/DL (ref 6.4–8.2)
WBC # BLD: 5.3 K/UL (ref 4–11)

## 2020-03-23 PROCEDURE — 71045 X-RAY EXAM CHEST 1 VIEW: CPT

## 2020-03-23 PROCEDURE — 87880 STREP A ASSAY W/OPTIC: CPT

## 2020-03-23 PROCEDURE — 6370000000 HC RX 637 (ALT 250 FOR IP): Performed by: PHYSICIAN ASSISTANT

## 2020-03-23 PROCEDURE — 99284 EMERGENCY DEPT VISIT MOD MDM: CPT

## 2020-03-23 PROCEDURE — 87804 INFLUENZA ASSAY W/OPTIC: CPT

## 2020-03-23 PROCEDURE — 6360000002 HC RX W HCPCS: Performed by: PHYSICIAN ASSISTANT

## 2020-03-23 PROCEDURE — 80053 COMPREHEN METABOLIC PANEL: CPT

## 2020-03-23 PROCEDURE — 96374 THER/PROPH/DIAG INJ IV PUSH: CPT

## 2020-03-23 PROCEDURE — 85027 COMPLETE CBC AUTOMATED: CPT

## 2020-03-23 PROCEDURE — 87081 CULTURE SCREEN ONLY: CPT

## 2020-03-23 PROCEDURE — 93005 ELECTROCARDIOGRAM TRACING: CPT | Performed by: PHYSICIAN ASSISTANT

## 2020-03-23 PROCEDURE — 94640 AIRWAY INHALATION TREATMENT: CPT

## 2020-03-23 RX ORDER — IPRATROPIUM BROMIDE AND ALBUTEROL SULFATE 2.5; .5 MG/3ML; MG/3ML
1 SOLUTION RESPIRATORY (INHALATION) ONCE
Status: COMPLETED | OUTPATIENT
Start: 2020-03-23 | End: 2020-03-23

## 2020-03-23 RX ORDER — PREDNISONE 20 MG/1
60 TABLET ORAL DAILY
Qty: 15 TABLET | Refills: 0 | Status: SHIPPED | OUTPATIENT
Start: 2020-03-23 | End: 2020-03-28

## 2020-03-23 RX ORDER — BENZONATATE 100 MG/1
100 CAPSULE ORAL 3 TIMES DAILY PRN
Qty: 15 CAPSULE | Refills: 0 | Status: SHIPPED | OUTPATIENT
Start: 2020-03-23 | End: 2020-03-26

## 2020-03-23 RX ORDER — METHYLPREDNISOLONE SODIUM SUCCINATE 125 MG/2ML
125 INJECTION, POWDER, LYOPHILIZED, FOR SOLUTION INTRAMUSCULAR; INTRAVENOUS ONCE
Status: COMPLETED | OUTPATIENT
Start: 2020-03-23 | End: 2020-03-23

## 2020-03-23 RX ADMIN — IPRATROPIUM BROMIDE AND ALBUTEROL SULFATE 1 AMPULE: .5; 3 SOLUTION RESPIRATORY (INHALATION) at 21:07

## 2020-03-23 RX ADMIN — METHYLPREDNISOLONE SODIUM SUCCINATE 125 MG: 125 INJECTION, POWDER, FOR SOLUTION INTRAMUSCULAR; INTRAVENOUS at 21:43

## 2020-03-23 ASSESSMENT — PAIN DESCRIPTION - LOCATION: LOCATION: THROAT

## 2020-03-23 ASSESSMENT — PAIN SCALES - GENERAL: PAINLEVEL_OUTOF10: 5

## 2020-03-23 NOTE — ED PROVIDER NOTES
[facility] Emergency Department    CHIEF COMPLAINT  Cough (Tanner ambulates into ED with c/o cough, congestion, sore throat, chills that have been going on since yesterday and have gradually gotten worse. )      HISTORY OF PRESENT ILLNESS  Elijah Barragan is a 59 y.o. male who presents to the ED complaining of 2-day history of URI symptoms. Patient dropped off by girlfriend. Patient non-smoker. History of asthma. Starting yesterday reports feeling unwell. Does not believe he has had any temperatures. Nonproductive cough without hemoptysis. Reports runny nose and sore throat. No ear pain. No headache, lightheadedness, dizziness or confusion. Denies chest pain. Mildly short of breath. No abdominal pain. No nausea vomiting. No diarrhea or constipation. No rashes. No recent travel, trauma, surgery. No sick contacts. No other complaints, modifying factors or associated symptoms. Nursing notes reviewed.    Past Medical History:   Diagnosis Date    Aneurysm [facility]) 2016    cerebral aneurysm - stated    Arthritis     Asthma     Cerebral artery occlusion with cerebral infarction ([facility] [address]     History of hepatitis C 2009    Recieved anti-viral therapy \"shots in the belly\" ; 05/09/18 HCV RNA Not Detected    Hyperlipidemia     Hypertension     Seizure disorder [facility])     states last seizure was 9/2019     Past Surgical History:   Procedure Laterality Date    ARTHRODESIS Right 1/31/2020    ARTHRODESIS METATARSOPHALANGEAL JOINT RIGHT GREAT TOE performed by Court Anton DPM at [address]  2016 08/13/19 Patient relates that he has never had colonoscopy    COLONOSCOPY N/A 11/6/2019    COLONOSCOPY WITH BIOPSY performed by Radha Auguste MD at [address] SFT TISS HAND/FNGR SUBQ 1.5+CM Right 10/3/2018    WOUND EXPLORATION, REMOVAL DEEP FOREIGN BODY RIGHT HAND performed by Darian Obrien MD at [address] [facility] tracheal tenderness or deviation. No crepitus. HEART: RRR. No murmurs. No chest wall tenderness. LUNGS: Respirations unlabored. CTAB. Good air exchange. Speaking comfortably in full sentences. ABDOMEN: Soft. Non-distended. Non-tender. No guarding or rebound. No midline pulsatile mass  . EXTREMITIES: No peripheral edema. No unilateral calf pain, redness or swelling. Moves all extremities equally. All extremities neurovascularly intact. SKIN: Warm and dry. No acute rashes. NEUROLOGICAL: Alert and oriented. CN's 2-12 intact. No gross facial drooping. Strength 5/5, sensation intact. PSYCHIATRIC: Normal mood and affect. RADIOLOGY  Xr Chest Portable    Result Date: 3/23/2020  EXAMINATION: ONE XRAY VIEW OF THE CHEST 3/23/2020 7:48 pm COMPARISON: 08/21/2018 HISTORY: ORDERING SYSTEM PROVIDED HISTORY: cough TECHNOLOGIST PROVIDED HISTORY: Reason for exam:->cough Reason for Exam: cough Acuity: Acute Type of Exam: Initial FINDINGS: The cardiac silhouette appears within normal limits for size given portable technique. No convincing evidence of a focal consolidation. Bibasilar atelectatic opacities. No pleural effusion or pneumothorax seen. Bibasilar opacities likely atelectatic changes. ED COURSE   I have evaluated this patient. Attending physician available for consultation. Patient received DuoNeb and Solu-Medrol IV for chest tightness and shortness of breath, with good relief. Triage vital stable. Patient afebrile. Rapid strep negative as well as rapid flu. chest x-ray without acute pathology. CBC and CMP unremarkable. findings appear consistent with viral URI and asthma exacerbation. Discharged home with return precautions and recommendations for follow-up and patient in agreement and comfortable at discharge. A discussion was had with Mr. Rambo Fang regarding URI symptoms, ED findings and recommendation for follow-up.   Patient will follow up with PCP within 2 to 3 days for further which are most concerning (e.g., changing or worsening pain, trouble swallowing or breating, neck stiffness, fever) that necessitate immediate return. Final Impression  1. Viral URI with cough    2. Exacerbation of asthma, unspecified asthma severity, unspecified whether persistent      Discharge Vital Signs:  Blood pressure (!) 164/81, pulse 83, temperature 98.5 °F (36.9 °C), temperature source Oral, resp. rate 23, height 5' 7\" (1.702 m), weight 225 lb (102.1 kg), SpO2 97 %. DISPOSITION  Patient was discharged to home in good condition.          Arden Rodriguez, [address]  03/23/20 [id number]

## 2020-03-24 ENCOUNTER — CARE COORDINATION (OUTPATIENT)
Dept: CARE COORDINATION | Age: 65
End: 2020-03-24

## 2020-03-24 LAB
EKG ATRIAL RATE: 70 BPM
EKG DIAGNOSIS: NORMAL
EKG P AXIS: 2 DEGREES
EKG P-R INTERVAL: 92 MS
EKG Q-T INTERVAL: 424 MS
EKG QRS DURATION: 156 MS
EKG QTC CALCULATION (BAZETT): 457 MS
EKG R AXIS: 99 DEGREES
EKG T AXIS: 52 DEGREES
EKG VENTRICULAR RATE: 70 BPM

## 2020-03-24 PROCEDURE — 93010 ELECTROCARDIOGRAM REPORT: CPT | Performed by: INTERNAL MEDICINE

## 2020-03-24 RX ORDER — ALBUTEROL SULFATE 90 UG/1
2 AEROSOL, METERED RESPIRATORY (INHALATION) EVERY 6 HOURS PRN
Qty: 1 INHALER | Refills: 5 | Status: SHIPPED | OUTPATIENT
Start: 2020-03-24

## 2020-03-24 NOTE — ED NOTES
Educated pt on x2 prescriptions and discharge paperwork as well as follow-up appointment. Pt verbalizes understanding of all instructions and denies questions. IV discontinued, catheter intact, minimal bleeding at IV site, 2x2 and tape applied, manual pressure held. Pt left ambulatory by self with all personal belongings, prescriptions xx2, and discharge paperwork to private residence. Pt in no distress at this time.        Moraima Rowland RN  03/23/20 9638

## 2020-03-25 LAB — S PYO THROAT QL CULT: NORMAL

## 2020-04-07 ENCOUNTER — CARE COORDINATION (OUTPATIENT)
Dept: CARE COORDINATION | Age: 65
End: 2020-04-07

## 2020-04-09 RX ORDER — SPIRONOLACTONE 25 MG/1
TABLET ORAL
Qty: 30 TABLET | Refills: 2 | Status: SHIPPED | OUTPATIENT
Start: 2020-04-09 | End: 2020-07-14

## 2020-05-15 RX ORDER — LEVETIRACETAM 500 MG/1
TABLET ORAL
Qty: 60 TABLET | Refills: 5 | Status: SHIPPED | OUTPATIENT
Start: 2020-05-15

## 2020-05-15 RX ORDER — AMLODIPINE BESYLATE 10 MG/1
TABLET ORAL
Qty: 30 TABLET | Refills: 5 | Status: SHIPPED | OUTPATIENT
Start: 2020-05-15

## 2020-07-14 RX ORDER — SPIRONOLACTONE 25 MG/1
TABLET ORAL
Qty: 30 TABLET | Refills: 2 | Status: SHIPPED | OUTPATIENT
Start: 2020-07-14

## 2020-10-23 ENCOUNTER — TELEPHONE (OUTPATIENT)
Dept: INTERNAL MEDICINE CLINIC | Age: 65
End: 2020-10-23

## 2021-06-23 ENCOUNTER — APPOINTMENT (OUTPATIENT)
Dept: GENERAL RADIOLOGY | Age: 66
End: 2021-06-23
Payer: MEDICARE

## 2021-06-23 ENCOUNTER — HOSPITAL ENCOUNTER (OUTPATIENT)
Age: 66
Setting detail: OBSERVATION
Discharge: HOME | End: 2021-06-23
Attending: EMERGENCY MEDICINE | Admitting: INTERNAL MEDICINE
Payer: MEDICARE

## 2021-06-23 VITALS
WEIGHT: 240 LBS | RESPIRATION RATE: 18 BRPM | SYSTOLIC BLOOD PRESSURE: 146 MMHG | DIASTOLIC BLOOD PRESSURE: 87 MMHG | TEMPERATURE: 98.7 F | HEIGHT: 67 IN | OXYGEN SATURATION: 94 % | HEART RATE: 63 BPM | BODY MASS INDEX: 37.67 KG/M2

## 2021-06-23 DIAGNOSIS — R07.9 CHEST PAIN, UNSPECIFIED TYPE: Primary | ICD-10-CM

## 2021-06-23 LAB
A/G RATIO: 1.2 (ref 1.1–2.2)
ALBUMIN SERPL-MCNC: 4.3 G/DL (ref 3.4–5)
ALP BLD-CCNC: 93 U/L (ref 40–129)
ALT SERPL-CCNC: 68 U/L (ref 10–40)
ANION GAP SERPL CALCULATED.3IONS-SCNC: 12 MMOL/L (ref 3–16)
APTT: 33.4 SEC (ref 24.2–36.2)
AST SERPL-CCNC: 51 U/L (ref 15–37)
BASE EXCESS VENOUS: 1.2 MMOL/L (ref -3–3)
BASOPHILS ABSOLUTE: 0.1 K/UL (ref 0–0.2)
BASOPHILS RELATIVE PERCENT: 1.2 %
BILIRUB SERPL-MCNC: 0.7 MG/DL (ref 0–1)
BUN BLDV-MCNC: 14 MG/DL (ref 7–20)
CALCIUM SERPL-MCNC: 9.4 MG/DL (ref 8.3–10.6)
CARBOXYHEMOGLOBIN: 1.7 % (ref 0–1.5)
CHLORIDE BLD-SCNC: 101 MMOL/L (ref 99–110)
CO2: 26 MMOL/L (ref 21–32)
CREAT SERPL-MCNC: 1 MG/DL (ref 0.8–1.3)
EKG ATRIAL RATE: 67 BPM
EKG DIAGNOSIS: NORMAL
EKG P AXIS: -14 DEGREES
EKG P-R INTERVAL: 132 MS
EKG Q-T INTERVAL: 454 MS
EKG QRS DURATION: 156 MS
EKG QTC CALCULATION (BAZETT): 479 MS
EKG R AXIS: 122 DEGREES
EKG T AXIS: 61 DEGREES
EKG VENTRICULAR RATE: 67 BPM
EOSINOPHILS ABSOLUTE: 0.1 K/UL (ref 0–0.6)
EOSINOPHILS RELATIVE PERCENT: 2.2 %
ESTIMATED AVERAGE GLUCOSE: 122.6 MG/DL
ETHANOL: NORMAL MG/DL (ref 0–0.08)
GFR AFRICAN AMERICAN: >60
GFR NON-AFRICAN AMERICAN: >60
GLOBULIN: 3.7 G/DL
GLUCOSE BLD-MCNC: 128 MG/DL (ref 70–99)
HBA1C MFR BLD: 5.9 %
HCO3 VENOUS: 26.3 MMOL/L (ref 23–29)
HCT VFR BLD CALC: 48.2 % (ref 40.5–52.5)
HEMOGLOBIN: 16.6 G/DL (ref 13.5–17.5)
LYMPHOCYTES ABSOLUTE: 1.2 K/UL (ref 1–5.1)
LYMPHOCYTES RELATIVE PERCENT: 17.7 %
MCH RBC QN AUTO: 30.1 PG (ref 26–34)
MCHC RBC AUTO-ENTMCNC: 34.5 G/DL (ref 31–36)
MCV RBC AUTO: 87.3 FL (ref 80–100)
METHEMOGLOBIN VENOUS: 0.6 %
MONOCYTES ABSOLUTE: 0.7 K/UL (ref 0–1.3)
MONOCYTES RELATIVE PERCENT: 11.1 %
NEUTROPHILS ABSOLUTE: 4.5 K/UL (ref 1.7–7.7)
NEUTROPHILS RELATIVE PERCENT: 67.8 %
O2 CONTENT, VEN: 24 VOL %
O2 SAT, VEN: 96 %
O2 THERAPY: ABNORMAL
PCO2, VEN: 43.5 MMHG (ref 40–50)
PDW BLD-RTO: 13.8 % (ref 12.4–15.4)
PH VENOUS: 7.4 (ref 7.35–7.45)
PLATELET # BLD: 171 K/UL (ref 135–450)
PMV BLD AUTO: 9.3 FL (ref 5–10.5)
PO2, VEN: 74.8 MMHG (ref 25–40)
POTASSIUM REFLEX MAGNESIUM: 4.2 MMOL/L (ref 3.5–5.1)
PRO-BNP: 67 PG/ML (ref 0–124)
RBC # BLD: 5.53 M/UL (ref 4.2–5.9)
SODIUM BLD-SCNC: 139 MMOL/L (ref 136–145)
SPECIMEN STATUS: NORMAL
TCO2 CALC VENOUS: 28 MMOL/L
TOTAL PROTEIN: 8 G/DL (ref 6.4–8.2)
TROPONIN: <0.01 NG/ML
WBC # BLD: 6.6 K/UL (ref 4–11)

## 2021-06-23 PROCEDURE — 82803 BLOOD GASES ANY COMBINATION: CPT

## 2021-06-23 PROCEDURE — G0378 HOSPITAL OBSERVATION PER HR: HCPCS

## 2021-06-23 PROCEDURE — 93005 ELECTROCARDIOGRAM TRACING: CPT | Performed by: EMERGENCY MEDICINE

## 2021-06-23 PROCEDURE — 83036 HEMOGLOBIN GLYCOSYLATED A1C: CPT

## 2021-06-23 PROCEDURE — 80053 COMPREHEN METABOLIC PANEL: CPT

## 2021-06-23 PROCEDURE — 36415 COLL VENOUS BLD VENIPUNCTURE: CPT

## 2021-06-23 PROCEDURE — 83880 ASSAY OF NATRIURETIC PEPTIDE: CPT

## 2021-06-23 PROCEDURE — 96372 THER/PROPH/DIAG INJ SC/IM: CPT

## 2021-06-23 PROCEDURE — 85730 THROMBOPLASTIN TIME PARTIAL: CPT

## 2021-06-23 PROCEDURE — 6360000002 HC RX W HCPCS: Performed by: INTERNAL MEDICINE

## 2021-06-23 PROCEDURE — 85025 COMPLETE CBC W/AUTO DIFF WBC: CPT

## 2021-06-23 PROCEDURE — 94640 AIRWAY INHALATION TREATMENT: CPT

## 2021-06-23 PROCEDURE — 6370000000 HC RX 637 (ALT 250 FOR IP): Performed by: INTERNAL MEDICINE

## 2021-06-23 PROCEDURE — 96375 TX/PRO/DX INJ NEW DRUG ADDON: CPT

## 2021-06-23 PROCEDURE — 82077 ASSAY SPEC XCP UR&BREATH IA: CPT

## 2021-06-23 PROCEDURE — 71045 X-RAY EXAM CHEST 1 VIEW: CPT

## 2021-06-23 PROCEDURE — 84484 ASSAY OF TROPONIN QUANT: CPT

## 2021-06-23 PROCEDURE — 93010 ELECTROCARDIOGRAM REPORT: CPT | Performed by: INTERNAL MEDICINE

## 2021-06-23 PROCEDURE — 6360000002 HC RX W HCPCS: Performed by: EMERGENCY MEDICINE

## 2021-06-23 PROCEDURE — 96374 THER/PROPH/DIAG INJ IV PUSH: CPT

## 2021-06-23 PROCEDURE — 99284 EMERGENCY DEPT VISIT MOD MDM: CPT

## 2021-06-23 PROCEDURE — 2580000003 HC RX 258: Performed by: INTERNAL MEDICINE

## 2021-06-23 RX ORDER — POLYETHYLENE GLYCOL 3350 17 G/17G
17 POWDER, FOR SOLUTION ORAL DAILY PRN
Status: DISCONTINUED | OUTPATIENT
Start: 2021-06-23 | End: 2021-06-23 | Stop reason: HOSPADM

## 2021-06-23 RX ORDER — FAMOTIDINE 20 MG/1
20 TABLET, FILM COATED ORAL 2 TIMES DAILY
Status: DISCONTINUED | OUTPATIENT
Start: 2021-06-23 | End: 2021-06-23 | Stop reason: HOSPADM

## 2021-06-23 RX ORDER — FUROSEMIDE 10 MG/ML
40 INJECTION INTRAMUSCULAR; INTRAVENOUS ONCE
Status: COMPLETED | OUTPATIENT
Start: 2021-06-23 | End: 2021-06-23

## 2021-06-23 RX ORDER — MORPHINE SULFATE 4 MG/ML
4 INJECTION, SOLUTION INTRAMUSCULAR; INTRAVENOUS ONCE
Status: COMPLETED | OUTPATIENT
Start: 2021-06-23 | End: 2021-06-23

## 2021-06-23 RX ORDER — LEVETIRACETAM 500 MG/1
1000 TABLET ORAL 2 TIMES DAILY
Status: DISCONTINUED | OUTPATIENT
Start: 2021-06-23 | End: 2021-06-23 | Stop reason: HOSPADM

## 2021-06-23 RX ORDER — CLOPIDOGREL BISULFATE 75 MG/1
75 TABLET ORAL DAILY
Status: DISCONTINUED | OUTPATIENT
Start: 2021-06-23 | End: 2021-06-23 | Stop reason: HOSPADM

## 2021-06-23 RX ORDER — SODIUM CHLORIDE 0.9 % (FLUSH) 0.9 %
5-40 SYRINGE (ML) INJECTION EVERY 12 HOURS SCHEDULED
Status: DISCONTINUED | OUTPATIENT
Start: 2021-06-23 | End: 2021-06-23 | Stop reason: HOSPADM

## 2021-06-23 RX ORDER — ONDANSETRON 2 MG/ML
4 INJECTION INTRAMUSCULAR; INTRAVENOUS ONCE
Status: COMPLETED | OUTPATIENT
Start: 2021-06-23 | End: 2021-06-23

## 2021-06-23 RX ORDER — AMLODIPINE BESYLATE 5 MG/1
10 TABLET ORAL DAILY
Status: DISCONTINUED | OUTPATIENT
Start: 2021-06-23 | End: 2021-06-23 | Stop reason: HOSPADM

## 2021-06-23 RX ORDER — M-VIT,TX,IRON,MINS/CALC/FOLIC 27MG-0.4MG
1 TABLET ORAL DAILY
Status: DISCONTINUED | OUTPATIENT
Start: 2021-06-23 | End: 2021-06-23 | Stop reason: HOSPADM

## 2021-06-23 RX ORDER — LOSARTAN POTASSIUM 100 MG/1
100 TABLET ORAL DAILY
Status: DISCONTINUED | OUTPATIENT
Start: 2021-06-23 | End: 2021-06-23 | Stop reason: HOSPADM

## 2021-06-23 RX ORDER — ONDANSETRON 2 MG/ML
4 INJECTION INTRAMUSCULAR; INTRAVENOUS EVERY 6 HOURS PRN
Status: DISCONTINUED | OUTPATIENT
Start: 2021-06-23 | End: 2021-06-23 | Stop reason: HOSPADM

## 2021-06-23 RX ORDER — ONDANSETRON 4 MG/1
4 TABLET, ORALLY DISINTEGRATING ORAL EVERY 8 HOURS PRN
Status: DISCONTINUED | OUTPATIENT
Start: 2021-06-23 | End: 2021-06-23 | Stop reason: HOSPADM

## 2021-06-23 RX ORDER — NITROGLYCERIN 0.4 MG/1
0.4 TABLET SUBLINGUAL EVERY 5 MIN PRN
Status: DISCONTINUED | OUTPATIENT
Start: 2021-06-23 | End: 2021-06-23 | Stop reason: HOSPADM

## 2021-06-23 RX ORDER — ACETAMINOPHEN 650 MG/1
650 SUPPOSITORY RECTAL EVERY 6 HOURS PRN
Status: DISCONTINUED | OUTPATIENT
Start: 2021-06-23 | End: 2021-06-23 | Stop reason: HOSPADM

## 2021-06-23 RX ORDER — TRAMADOL HYDROCHLORIDE 50 MG/1
50 TABLET ORAL EVERY 6 HOURS PRN
Qty: 28 TABLET | Refills: 0 | Status: SHIPPED | OUTPATIENT
Start: 2021-06-23 | End: 2021-06-30

## 2021-06-23 RX ORDER — ACETAMINOPHEN 325 MG/1
650 TABLET ORAL EVERY 6 HOURS PRN
Status: DISCONTINUED | OUTPATIENT
Start: 2021-06-23 | End: 2021-06-23 | Stop reason: HOSPADM

## 2021-06-23 RX ORDER — SODIUM CHLORIDE 9 MG/ML
25 INJECTION, SOLUTION INTRAVENOUS PRN
Status: DISCONTINUED | OUTPATIENT
Start: 2021-06-23 | End: 2021-06-23 | Stop reason: HOSPADM

## 2021-06-23 RX ORDER — ASPIRIN 81 MG/1
81 TABLET ORAL DAILY
Status: DISCONTINUED | OUTPATIENT
Start: 2021-06-23 | End: 2021-06-23 | Stop reason: HOSPADM

## 2021-06-23 RX ORDER — LEVETIRACETAM 500 MG/1
500 TABLET ORAL 2 TIMES DAILY
Status: DISCONTINUED | OUTPATIENT
Start: 2021-06-23 | End: 2021-06-23

## 2021-06-23 RX ORDER — BUDESONIDE AND FORMOTEROL FUMARATE DIHYDRATE 160; 4.5 UG/1; UG/1
2 AEROSOL RESPIRATORY (INHALATION) 2 TIMES DAILY
Status: DISCONTINUED | OUTPATIENT
Start: 2021-06-23 | End: 2021-06-23 | Stop reason: HOSPADM

## 2021-06-23 RX ORDER — LEVETIRACETAM 500 MG/1
500 TABLET ORAL ONCE
Status: COMPLETED | OUTPATIENT
Start: 2021-06-23 | End: 2021-06-23

## 2021-06-23 RX ORDER — ATORVASTATIN CALCIUM 40 MG/1
40 TABLET, FILM COATED ORAL NIGHTLY
Status: DISCONTINUED | OUTPATIENT
Start: 2021-06-23 | End: 2021-06-23 | Stop reason: HOSPADM

## 2021-06-23 RX ORDER — CITALOPRAM 20 MG/1
20 TABLET ORAL DAILY
Status: DISCONTINUED | OUTPATIENT
Start: 2021-06-23 | End: 2021-06-23 | Stop reason: HOSPADM

## 2021-06-23 RX ORDER — SPIRONOLACTONE 25 MG/1
25 TABLET ORAL DAILY
Status: DISCONTINUED | OUTPATIENT
Start: 2021-06-23 | End: 2021-06-23 | Stop reason: HOSPADM

## 2021-06-23 RX ORDER — SODIUM CHLORIDE 0.9 % (FLUSH) 0.9 %
5-40 SYRINGE (ML) INJECTION PRN
Status: DISCONTINUED | OUTPATIENT
Start: 2021-06-23 | End: 2021-06-23 | Stop reason: HOSPADM

## 2021-06-23 RX ADMIN — ACETAMINOPHEN 650 MG: 325 TABLET ORAL at 10:09

## 2021-06-23 RX ADMIN — MORPHINE SULFATE 4 MG: 4 INJECTION, SOLUTION INTRAMUSCULAR; INTRAVENOUS at 05:48

## 2021-06-23 RX ADMIN — Medication 2 PUFF: at 07:47

## 2021-06-23 RX ADMIN — FUROSEMIDE 40 MG: 10 INJECTION, SOLUTION INTRAMUSCULAR; INTRAVENOUS at 06:18

## 2021-06-23 RX ADMIN — FAMOTIDINE 20 MG: 20 TABLET ORAL at 08:18

## 2021-06-23 RX ADMIN — AMLODIPINE BESYLATE 10 MG: 5 TABLET ORAL at 08:21

## 2021-06-23 RX ADMIN — ENOXAPARIN SODIUM 40 MG: 40 INJECTION SUBCUTANEOUS at 08:22

## 2021-06-23 RX ADMIN — MULTIPLE VITAMINS W/ MINERALS TAB 1 TABLET: TAB at 08:20

## 2021-06-23 RX ADMIN — LEVETIRACETAM 500 MG: 500 TABLET ORAL at 08:20

## 2021-06-23 RX ADMIN — Medication 10 ML: at 08:23

## 2021-06-23 RX ADMIN — ONDANSETRON 4 MG: 2 INJECTION INTRAMUSCULAR; INTRAVENOUS at 05:49

## 2021-06-23 RX ADMIN — MAGNESIUM HYDROXIDE/ALUMINUM HYDROXICE/SIMETHICONE: 120; 1200; 1200 SUSPENSION ORAL at 08:31

## 2021-06-23 RX ADMIN — NITROGLYCERIN 0.4 MG: 0.4 TABLET, ORALLY DISINTEGRATING SUBLINGUAL at 07:39

## 2021-06-23 RX ADMIN — SPIRONOLACTONE 25 MG: 25 TABLET ORAL at 08:22

## 2021-06-23 RX ADMIN — LOSARTAN POTASSIUM 100 MG: 100 TABLET, FILM COATED ORAL at 08:22

## 2021-06-23 RX ADMIN — LEVETIRACETAM 500 MG: 500 TABLET ORAL at 10:09

## 2021-06-23 RX ADMIN — CITALOPRAM HYDROBROMIDE 20 MG: 20 TABLET ORAL at 08:19

## 2021-06-23 RX ADMIN — CLOPIDOGREL BISULFATE 75 MG: 75 TABLET ORAL at 08:19

## 2021-06-23 ASSESSMENT — ENCOUNTER SYMPTOMS
CHEST TIGHTNESS: 1
SHORTNESS OF BREATH: 1
VOMITING: 0
WHEEZING: 0
PHOTOPHOBIA: 0
NAUSEA: 0
COUGH: 1
RHINORRHEA: 0
BACK PAIN: 0
COLOR CHANGE: 1

## 2021-06-23 ASSESSMENT — HEART SCORE: ECG: 1

## 2021-06-23 ASSESSMENT — PAIN DESCRIPTION - FREQUENCY
FREQUENCY: OTHER (COMMENT)
FREQUENCY: INTERMITTENT

## 2021-06-23 ASSESSMENT — PAIN DESCRIPTION - ONSET
ONSET: GRADUAL
ONSET_2: ON-GOING
ONSET: GRADUAL

## 2021-06-23 ASSESSMENT — PAIN DESCRIPTION - PAIN TYPE
TYPE: ACUTE PAIN
TYPE_2: CHRONIC PAIN
TYPE: ACUTE PAIN

## 2021-06-23 ASSESSMENT — PAIN SCALES - GENERAL
PAINLEVEL_OUTOF10: 5
PAINLEVEL_OUTOF10: 8
PAINLEVEL_OUTOF10: 6
PAINLEVEL_OUTOF10: 9
PAINLEVEL_OUTOF10: 8
PAINLEVEL_OUTOF10: 6
PAINLEVEL_OUTOF10: 8
PAINLEVEL_OUTOF10: 5
PAINLEVEL_OUTOF10: 8

## 2021-06-23 ASSESSMENT — PAIN DESCRIPTION - PROGRESSION
CLINICAL_PROGRESSION: GRADUALLY WORSENING
CLINICAL_PROGRESSION: OTHER (COMMENT)
CLINICAL_PROGRESSION: GRADUALLY WORSENING
CLINICAL_PROGRESSION: GRADUALLY WORSENING
CLINICAL_PROGRESSION_2: NOT CHANGED

## 2021-06-23 ASSESSMENT — PAIN DESCRIPTION - ORIENTATION
ORIENTATION: MID
ORIENTATION_2: POSTERIOR;LEFT;RIGHT

## 2021-06-23 ASSESSMENT — PAIN DESCRIPTION - INTENSITY: RATING_2: 6

## 2021-06-23 ASSESSMENT — PAIN DESCRIPTION - DESCRIPTORS
DESCRIPTORS: BURNING;OTHER (COMMENT)
DESCRIPTORS: OTHER (COMMENT);TIGHTNESS
DESCRIPTORS_2: SHARP

## 2021-06-23 ASSESSMENT — PAIN - FUNCTIONAL ASSESSMENT
PAIN_FUNCTIONAL_ASSESSMENT: ACTIVITIES ARE NOT PREVENTED
PAIN_FUNCTIONAL_ASSESSMENT: ACTIVITIES ARE NOT PREVENTED

## 2021-06-23 ASSESSMENT — PAIN DESCRIPTION - LOCATION
LOCATION: CHEST
LOCATION_2: LEG

## 2021-06-23 ASSESSMENT — PAIN DESCRIPTION - DURATION: DURATION_2: INTERMITTENT

## 2021-06-23 NOTE — PROGRESS NOTES
Pt verbalizing chest pain 9/10 on arrival to floor /118, Nitro given without relief. States cough and bruising on right side of chest began 3 days ago. States bruise began small and has increased in size. Traced edges with marker to watch if increasing in size. Pain increases with cough and laying flat. Pain decreases when sitting upright. Gi cocktail, cold pack, and mornign meds given pain decreased to 6/10 Bp now 177/95. Tylenol and fresh cold pack given.

## 2021-06-23 NOTE — ED NOTES
Bed: 12  Expected date:   Expected time:   Means of arrival:   Comments:  NR [id number] [facility], RN  06/23/21 [id number]

## 2021-06-23 NOTE — PROGRESS NOTES
Pt ok for discharge per MD. Discharge instructions and script given. IV removed without complications, bandage applied. Telebox removed and returned. No questions or concerns at this time. Transported to personal car.

## 2021-06-23 NOTE — H&P
Hospital Medicine History & Physical      PCP: Kika Chavez MD    Date of Admission: 6/23/2021    Date of Service: Pt seen/examined on 23 June and placed in OBServation. Chief Complaint:  Chest Pain       History Of Present Illness:         72 y.o. male w/ hx of known CAD who presented to [facility] with acute onset moderately severe non-exertional substernal Chest Pain w/out associated typical cardiac features including SOB and N/V/Diaphoresis. Pain seems to be associated w/ extensive bruising c/w trauma. The patient denies any fever/chills or other signs/sxs of systemic illness or identifiable aggravating/alleviating factors. Past Medical History:          Diagnosis Date    Aneurysm ([facility] [address] 2016    cerebral aneurysm - stated    Arthritis     Asthma     Cerebral artery occlusion with cerebral infarction ([facility] [address]     History of hepatitis C 2009    Recieved anti-viral therapy \"shots in the belly\" ; 05/09/18 HCV RNA Not Detected    Hyperlipidemia     Hypertension     Seizure disorder [facility])     states last seizure was 9/2019       Past Surgical History:          Procedure Laterality Date    ARTHRODESIS Right 1/31/2020    ARTHRODESIS METATARSOPHALANGEAL JOINT RIGHT GREAT TOE performed by Aydee José DPM at [address] [place] [address] Drive  2016 08/13/19 Patient relates that he has never had colonoscopy    COLONOSCOPY N/A 11/6/2019    COLONOSCOPY WITH BIOPSY performed by Farzad Duvall MD at [address] SFT TISS HAND/FNGR SUBQ 1.5+CM Right 10/3/2018    WOUND EXPLORATION, REMOVAL DEEP FOREIGN BODY RIGHT HAND performed by Arlin Gorman MD at [address]       Medications Prior to Admission:      Prior to Admission medications    Medication Sig Start Date End Date Taking?  Authorizing Provider   spironolactone (ALDACTONE) 25 MG tablet TAKE 1 TABLET BY MOUTH DAILY 7/14/20   Jimenez Wilde MD   amLODIPine (NORVASC) 10 MG tablet TAKE 1 TABLET BY MOUTH DAILY 5/15/20   Heath John MD   levETIRAcetam (KEPPRA) 500 MG tablet TAKE ONE (1) TABLET BY MOUTH TWICE DAILY 5/15/20   Heath John MD   albuterol sulfate HFA (PROAIR HFA) 108 (90 Base) MCG/ACT inhaler Inhale 2 puffs into the lungs every 6 hours as needed for Wheezing 3/24/20   Heath John MD   losartan (COZAAR) 100 MG tablet TAKE 1 TABLET BY MOUTH DAILY 2/17/20   Heath John MD   Fluticasone furoate-vilanterol (BREO ELLIPTA) 200-25 MCG/INH AEPB inhaler Inhale two puffs daily 11/7/19   Heath John MD   mometasone-formoterol [facility]) 200-5 MCG/ACT inhaler Inhale 2 puffs into the lungs every 12 hours 11/7/19   Heath John MD   Multiple Vitamins-Minerals (MULTI FOR HIM 50+) TABS TAKE 1 TABLET BY MOUTH ONCE DAILY 10/22/19   Heath John MD   atorvastatin (LIPITOR) 40 MG tablet TAKE 1 TABLET BY MOUTH NIGHTLY 9/25/19   Heath John MD   citalopram (CELEXA) 20 MG tablet TAKE 1 TABLET BY MOUTH DAILY 9/25/19   Heath John MD   clopidogrel (PLAVIX) 75 MG tablet TAKE (1) TABLET BY MOUTH DAILY 9/25/19   Heath John MD   ASPIRIN LOW DOSE 81 MG EC tablet TAKE (1) TABLET BY MOUTH DAILY 9/25/19   Heath John MD   topiramate (TOPAMAX) 25 MG tablet Take 2 tablets by mouth 2 times daily 2 po bid  Patient not taking: Reported on 1/8/2020 8/21/16   Yarely Antonio MD       Allergies:  Gadolinium derivatives, Iodinated diagnostic agents, Ace inhibitors, and Other    Social History:      The patient currently lives independently    TOBACCO:   reports that he has never smoked. He has never used smokeless tobacco.  ETOH:   reports current alcohol use of about 42.0 standard drinks of alcohol per week. Family History:      Reviewed in detail and negative for DM, CAD, Cancer, CVA.  Positive as follows:        Problem Relation Age of Onset    Other Mother         aneurysm    Diabetes Mother     High Blood Pressure Mother     High Cholesterol Mother     Heart Disease Father     Diabetes Father     High Blood Pressure Father     High Cholesterol Father        REVIEW OF SYSTEMS:   Pertinent positives as noted in the HPI. All other systems reviewed and negative. PHYSICAL EXAM:    BP (!) 187/118   Pulse 67   Temp 98.7 °F (37.1 °C) (Oral)   Resp 18   Ht 5' 7\" (1.702 m)   Wt 240 lb (108.9 kg)   SpO2 92%   BMI 37.59 kg/m²     General appearance:  No apparent distress, appears stated age and cooperative. HEENT:  Normal cephalic, atraumatic without obvious deformity. Pupils equal, round, and reactive to light. Extra ocular muscles intact. Conjunctivae/corneas clear. Neck: Supple, with full range of motion. No jugular venous distention. Trachea midline. Respiratory:  Normal respiratory effort. Clear to auscultation, bilaterally without Rales/Wheezes/Rhonchi. Cardiovascular:  Regular rate and rhythm with normal S1/S2 without murmurs, rubs or gallops. Abdomen: Soft, non-tender, non-distended with normal bowel sounds. Musculoskeletal:  No clubbing, cyanosis or edema bilaterally. Full range of motion without deformity. Skin: Skin color, texture, turgor normal.  No rashes or lesions. Neurologic:  Neurovascularly intact without any focal sensory/motor deficits. Cranial nerves: II-XII intact, grossly non-focal.  Psychiatric:  Alert and oriented, thought content appropriate, normal insight  Capillary Refill: Brisk,< 3 seconds   Peripheral Pulses: +2 palpable, equal bilaterally       CXR:  I have reviewed the CXR with the following interpretation: mild interstitial edema  EKG:  I have reviewed the EKG with the following interpretation: NSR w/ no acute ischemic changes.      Labs:     Recent Labs     06/23/21 0522   WBC 6.6   HGB 16.6   HCT 48.2        Recent Labs     06/23/21 0522      K 4.2      CO2 26   BUN 14   CREATININE 1.0   CALCIUM 9.4     Recent Labs     06/23/21 0522   AST 51*   ALT 68*   BILITOT 0.7

## 2021-06-23 NOTE — ED PROVIDER NOTES
[facility]  ED  EMERGENCY DEPARTMENTENCOUNTER      Pt Name: Viviana Aguilera  MRN: [id number]  Lexigfviral 1955  Date ofevaluation: 6/23/2021  Provider: Lloyd Foote MD    CHIEF COMPLAINT       Chief Complaint   Patient presents with    Chest Pain     X 3 days, large bruise noted to right chest, on blood thinners, 324 mg ASA, 1X Nitro          HISTORY OF PRESENT ILLNESS   (Location/Symptom, Timing/Onset,Context/Setting, Quality, Duration, Modifying Factors, Severity)  Note limiting factors. Viviana Aguilera is a 72 y.o. male  who  has a past medical history of Aneurysm ([facility] [address] Arthritis, Asthma, Cerebral artery occlusion with cerebral infarction [facility]), History of hepatitis C, Hyperlipidemia, Hypertension, and Seizure disorder ([facility] [address] who presents to the emergency department for evaluation of cough and chest pain. Patient reports 3-day history of bruising to the right chest and pain that extends across his the entirety of his chest.  It has been intermittent in nature without remitting or exacerbating factors. Patient reports that he had chest pain that woke him up from sleep this evening and presented to the ED via EMS. denies a history of previous. Patient reports that he has a history of seizures ordered but is unsure if he had seizures. He does not remember any any recent injury or trauma that may acute cause the bruising to his chest.  Patient also reports a nonproductive cough. He states he has had 1 dose of the Covid vaccination. He denies fevers. He does report diffuse abdominal pain. Denies nausea or vomiting. Denies changes in bowel or urine function. He was given aspirin nitroglycerin in route by EMS. Patient does report a history of frequent alcohol use and is unsure if his seizures are related to cessation from alcohol. Reviewing the patient's medical record his last stress test was in 2018. HPI    NursingNotes were reviewed.     REVIEW OF SYSTEMS    (2-9 systems for level 4, 10 or more for level 5)     Review of Systems   Constitutional: Negative for activity change, chills and fever. HENT: Negative for congestion and rhinorrhea. Eyes: Negative for photophobia and visual disturbance. Respiratory: Positive for cough, chest tightness and shortness of breath. Negative for wheezing. Cardiovascular: Positive for chest pain. Negative for palpitations and leg swelling. Gastrointestinal: Negative for nausea and vomiting. Endocrine: Negative for polydipsia and polyuria. Genitourinary: Negative for difficulty urinating and frequency. Musculoskeletal: Positive for myalgias. Negative for back pain and gait problem. Skin: Positive for color change. Negative for rash. Neurological: Negative for light-headedness and headaches. Psychiatric/Behavioral: Negative for confusion. The patient is not nervous/anxious. All other systems reviewed and are negative. Except as noted above the remainder of the review of systems was reviewed and negative.        PAST MEDICAL HISTORY     Past Medical History:   Diagnosis Date    Aneurysm [facility]) 2016    cerebral aneurysm - stated    Arthritis     Asthma     Cerebral artery occlusion with cerebral infarction ([facility] [address]     History of hepatitis C 2009    Recieved anti-viral therapy \"shots in the belly\" ; 05/09/18 HCV RNA Not Detected    Hyperlipidemia     Hypertension     Seizure disorder [facility])     states last seizure was 9/2019         [facility]       Past Surgical History:   Procedure Laterality Date    ARTHRODESIS Right 1/31/2020    ARTHRODESIS METATARSOPHALANGEAL JOINT RIGHT GREAT TOE performed by Elan Blancas DPM at [address] [place] [address] Drive  2016 08/13/19 Patient relates that he has never had colonoscopy    COLONOSCOPY N/A 11/6/2019    COLONOSCOPY WITH BIOPSY performed by Willy Osler, MD at [address] SFT TISS HAND/FNGR SUBQ 1.5+CM Right 10/3/2018    WOUND EXPLORATION, REMOVAL DEEP FOREIGN BODY RIGHT HAND performed by Joeyjemal Ga MD at [address] [facility]       Previous Medications    ALBUTEROL SULFATE HFA (PROAIR HFA) 108 (90 BASE) MCG/ACT INHALER    Inhale 2 puffs into the lungs every 6 hours as needed for Wheezing    AMLODIPINE (NORVASC) 10 MG TABLET    TAKE 1 TABLET BY MOUTH DAILY    ASPIRIN LOW DOSE 81 MG EC TABLET    TAKE (1) TABLET BY MOUTH DAILY    ATORVASTATIN (LIPITOR) 40 MG TABLET    TAKE 1 TABLET BY MOUTH NIGHTLY    CITALOPRAM (CELEXA) 20 MG TABLET    TAKE 1 TABLET BY MOUTH DAILY    CLOPIDOGREL (PLAVIX) 75 MG TABLET    TAKE (1) TABLET BY MOUTH DAILY    FLUTICASONE FUROATE-VILANTEROL (BREO ELLIPTA) 200-25 MCG/INH AEPB INHALER    Inhale two puffs daily    LEVETIRACETAM (KEPPRA) 500 MG TABLET    TAKE ONE (1) TABLET BY MOUTH TWICE DAILY    LOSARTAN (COZAAR) 100 MG TABLET    TAKE 1 TABLET BY MOUTH DAILY    MOMETASONE-FORMOTEROL (DULERA) 200-5 MCG/ACT INHALER    Inhale 2 puffs into the lungs every 12 hours    MULTIPLE VITAMINS-MINERALS (MULTI FOR HIM 50+) TABS    TAKE 1 TABLET BY MOUTH ONCE DAILY    SPIRONOLACTONE (ALDACTONE) 25 MG TABLET    TAKE 1 TABLET BY MOUTH DAILY    TOPIRAMATE (TOPAMAX) 25 MG TABLET    Take 2 tablets by mouth 2 times daily 2 po bid            Gadolinium derivatives, Iodinated diagnostic agents, Ace inhibitors, and Other    FAMILY HISTORY       Family History   Problem Relation Age of Onset    Other Mother         aneurysm    Diabetes Mother     High Blood Pressure Mother     High Cholesterol Mother     Heart Disease Father     Diabetes Father     High Blood Pressure Father     High Cholesterol Father           SOCIAL HISTORY       Social History     Socioeconomic History    Marital status: Single     Spouse name: None    Number of children: None    Years of education: None    Highest education level: None   Occupational History    Occupation:  Construction     Comment: SSI since CVA with R sided weakness Tobacco Use    Smoking status: Never Smoker    Smokeless tobacco: Never Used   Vaping Use    Vaping Use: Never assessed   Substance and Sexual Activity    Alcohol use: Yes     Alcohol/week: 42.0 standard drinks     Types: 42 Cans of beer per week    Drug use: Yes     Types: Marijuana     Comment: daily    Sexual activity: Yes   Other Topics Concern    None   Social History Narrative    None     Social Determinants of Health     Financial Resource Strain:     Difficulty of Paying Living Expenses:    Food Insecurity:     Worried About Running Out of Food in the Last Year:     Ran Out of Food in the Last Year:    Transportation Needs:     Lack of Transportation (Medical):  Lack of Transportation (Non-Medical):    Physical Activity:     Days of Exercise per Week:     Minutes of Exercise per Session:    Stress:     Feeling of Stress :    Social Connections:     Frequency of Communication with Friends and Family:     Frequency of Social Gatherings with Friends and Family:     Attends Yazdanism Services:     Active Member of Clubs or Organizations:     Attends Club or Organization Meetings:     Marital Status:    Intimate Partner Violence:     Fear of Current or Ex-Partner:     Emotionally Abused:     Physically Abused:     Sexually Abused:        SCREENINGS             PHYSICAL EXAM    (up to 7 for level 4, 8 or more for level 5)     ED Triage Vitals   BP Temp Temp Source Pulse Resp SpO2 Height Weight   06/23/21 0519 06/23/21 0519 06/23/21 0519 06/23/21 0519 06/23/21 0519 06/23/21 0519 06/23/21 0516 06/23/21 0516   (!) 188/91 98 °F (36.7 °C) Oral 60 22 96 % 5' 7\" (1.702 m) 240 lb (108.9 kg)       Physical Exam  Vitals and nursing note reviewed. Constitutional:       General: He is not in acute distress. Appearance: He is well-developed. HENT:      Head: Normocephalic and atraumatic. Mouth/Throat:      Mouth: Mucous membranes are dry.    Eyes:      Extraocular Movements: Extraocular movements intact. Conjunctiva/sclera: Conjunctivae normal.      Pupils: Pupils are equal, round, and reactive to light. Neck:      Trachea: No tracheal deviation. Cardiovascular:      Rate and Rhythm: Normal rate and regular rhythm. Pulses: Normal pulses. Pulmonary:      Effort: Pulmonary effort is normal.      Breath sounds: Normal breath sounds. No wheezing or rales. Abdominal:      General: There is no distension. Palpations: Abdomen is soft. Tenderness: There is no abdominal tenderness. There is no guarding or rebound. Musculoskeletal:         General: Swelling and tenderness present. No deformity. Normal range of motion. Cervical back: Normal range of motion. Right lower leg: No edema. Left lower leg: No edema. Skin:     General: Skin is warm and dry. Capillary Refill: Capillary refill takes less than 2 seconds. Findings: Bruising present. Neurological:      Mental Status: He is alert and oriented to person, place, and time. RESULTS     EKG: All EKG's are interpreted by the Emergency Department Physician who either signs or Co-signsthis chart in the absence of a cardiologist.    EKG shows a sinus rhythm with a ventricular rate of 67 bpm.  Patient's ND interval and QTc interval within normal limits. Patient has a normal axis. There are no significant ST elevations or depressions EKG is nondiagnostic for ACS. Compared EKG from 1/8/2020 did not appreciate significant changes.      RADIOLOGY:   Non-plain filmimages such as CT, Ultrasound and MRI are read by the radiologist. Plain radiographic images are visualized and preliminarily interpreted by the emergency physician with the below findings:      Interpretation per the Radiologist below, if available at the time ofthis note:    XR CHEST PORTABLE    (Results Pending)         ED BEDSIDE ULTRASOUND:   Performed by ED Physician - none    LABS:  Labs Reviewed   CBC WITH AUTO DIFFERENTIAL procedures. There was a high probability ofclinically significant/life threatening deterioration in the patient's condition which required my urgent intervention. CONSULTS:  None    PROCEDURES:  Unless otherwise noted below, none     Procedures    FINAL IMPRESSION      1. Chest pain, unspecified type          DISPOSITION/PLAN   DISPOSITION        PATIENT REFERREDTO:  No follow-up provider specified.     DISCHARGEMEDICATIONS:  New Prescriptions    No medications on file          (Please note that portions of this note were completed with a voice recognition program.  Efforts were made to edit the dictations but occasionally words are mis-transcribed.)    Adam Saez MD (electronically signed)  Attending Emergency Physician          Adam Saez MD  06/23/21 [id number]

## 2021-06-23 NOTE — ED NOTES
Bedside report given to [facility] from B[facility]. [facility] confirmed tele box 31.        Jax Pérez RN  06/23/21 [id number]

## 2021-06-24 NOTE — DISCHARGE SUMMARY
Hospital Medicine Discharge Summary    Patient ID: Luis Allen      Patient's PCP: Brian Cardoza MD    Admit Date: 6/23/2021     Discharge Date: 6/23/2021      Admitting Physician: Iglesia Urbina MD     Discharge Physician: Solo Wong MD     Discharge Diagnoses: Active Hospital Problems    Diagnosis     Obesity [E66.9]     HTN (hypertension), benign [I10]     Chest pain [R07.9]        The patient was seen and examined on day of discharge and this discharge summary is in conjunction with any daily progress note from day of discharge. Hospital Course:       Chest pain - Concerning to ED for ACS, etiology likely 2nd to trauma w/ visible bruising. Initial enzymes/EKG negative. Followed serial enzymes and monitor on tele, w/out evidence of ischemia/arrythmia. Stress test April 2018 w/out evidence of ischemia - NOT reordered.     HTN - w/out known CAD and no evidence of active signs/sxs of ischemia/failure. Currently controlled on home meds      Known Seizure d/o - controlled on home Keppra - continued.      Obesity -  With Body mass index is 37.59 kg/m². Complicating assessment and treatment. Placing patient at risk for multiple co-morbidities as well as early death and contributing to the patient's presentation. Counseled on weight loss.       Labs: For convenience and continuity at follow-up the following most recent labs are provided:      CBC:    Lab Results   Component Value Date    WBC 6.6 06/23/2021    HGB 16.6 06/23/2021    HCT 48.2 06/23/2021     06/23/2021       Renal:    Lab Results   Component Value Date     06/23/2021    K 4.2 06/23/2021     06/23/2021    CO2 26 06/23/2021    BUN 14 06/23/2021    CREATININE 1.0 06/23/2021    CALCIUM 9.4 06/23/2021         Significant Diagnostic Studies    Radiology:   XR CHEST PORTABLE   Final Result   Cardiomegaly with mild interstitial edema.                 Consults:     None    Disposition: Home     Condition at Discharge: Stable    Discharge Instructions/Follow-up:  w/ PCP 1-2 weeks and subspecialists as arranged. Code Status:  Full Code    Activity: activity as tolerated    Diet: regular diet      Discharge Medications:     Discharge Medication List as of 6/23/2021  2:35 PM           Details   traMADol (ULTRAM) 50 MG tablet Take 1 tablet by mouth every 6 hours as needed for Pain for up to 7 days. Intended supply: 7 days.  Take lowest dose possible to manage pain, Disp-28 tablet, R-0Print              Details   spironolactone (ALDACTONE) 25 MG tablet TAKE 1 TABLET BY MOUTH DAILY, Disp-30 tablet,R-2Normal      amLODIPine (NORVASC) 10 MG tablet TAKE 1 TABLET BY MOUTH DAILY, Disp-30 tablet,R-5Normal      levETIRAcetam (KEPPRA) 500 MG tablet TAKE ONE (1) TABLET BY MOUTH TWICE DAILY, Disp-60 tablet,R-5Normal      albuterol sulfate HFA (PROAIR HFA) 108 (90 Base) MCG/ACT inhaler Inhale 2 puffs into the lungs every 6 hours as needed for Wheezing, Disp-1 Inhaler,R-5Normal      losartan (COZAAR) 100 MG tablet TAKE 1 TABLET BY MOUTH DAILY, Disp-30 tablet, R-5Normal      Fluticasone furoate-vilanterol (BREO ELLIPTA) 200-25 MCG/INH AEPB inhaler Inhale two puffs daily, Disp-1 each, R-3Phone In      mometasone-formoterol (DULERA) 200-5 MCG/ACT inhaler Inhale 2 puffs into the lungs every 12 hours, Disp-1 Inhaler, R-5Normal      Multiple Vitamins-Minerals (MULTI FOR HIM 50+) TABS TAKE 1 TABLET BY MOUTH ONCE DAILY, Disp-90 tablet, R-10Normal      atorvastatin (LIPITOR) 40 MG tablet TAKE 1 TABLET BY MOUTH NIGHTLY, Disp-30 tablet, R-10Normal      citalopram (CELEXA) 20 MG tablet TAKE 1 TABLET BY MOUTH DAILY, Disp-30 tablet, R-10Normal      clopidogrel (PLAVIX) 75 MG tablet TAKE (1) TABLET BY MOUTH DAILY, Disp-30 tablet, R-10Normal      ASPIRIN LOW DOSE 81 MG EC tablet TAKE (1) TABLET BY MOUTH DAILY, Disp-30 tablet, R-10Normal      topiramate (TOPAMAX) 25 MG tablet Take 2 tablets by mouth 2 times daily 2 po bid, Disp-60 tablet, R-0             Time Spent on discharge is more than 30 minutes in the examination, evaluation, counseling and review of medications and discharge plan. Signed:    Casimer Fabry, MD   6/24/2021      Thank you Mariya Walsh MD for the opportunity to be involved in this patient's care. If you have any questions or concerns please feel free to contact me at [phone].

## 2022-09-09 NOTE — TELEPHONE ENCOUNTER
Pharm called and left  about Pt's Spironolactone , I called pharm back and was on hold for 10 minutes before I had to hang up. Looks like Pt's meds aren't being filled because he hasn't seen Dr. Abiola Ramirez in a year.
36.9

## 2022-12-08 ENCOUNTER — HOSPITAL ENCOUNTER (OUTPATIENT)
Age: 67
Discharge: HOME | End: 2022-12-08
Payer: MEDICAID

## 2022-12-08 ENCOUNTER — HOSPITAL ENCOUNTER (OUTPATIENT)
Dept: GENERAL RADIOLOGY | Age: 67
Discharge: HOME | End: 2022-12-08
Payer: MEDICAID

## 2022-12-08 DIAGNOSIS — M25.512 LEFT SHOULDER PAIN, UNSPECIFIED CHRONICITY: ICD-10-CM

## 2022-12-08 DIAGNOSIS — R07.81 ANTERIOR PLEURITIC PAIN: ICD-10-CM

## 2022-12-08 PROCEDURE — 73030 X-RAY EXAM OF SHOULDER: CPT

## 2022-12-08 PROCEDURE — 71101 X-RAY EXAM UNILAT RIBS/CHEST: CPT

## 2023-01-12 ENCOUNTER — HOSPITAL ENCOUNTER (OUTPATIENT)
Dept: PHYSICAL THERAPY | Age: 68
Setting detail: THERAPIES SERIES
Discharge: HOME | End: 2023-01-12
Payer: MEDICARE

## 2023-01-12 DIAGNOSIS — M25.512 ACUTE PAIN OF LEFT SHOULDER: Primary | ICD-10-CM

## 2023-01-12 PROCEDURE — 97110 THERAPEUTIC EXERCISES: CPT | Performed by: PHYSICAL THERAPIST

## 2023-01-12 PROCEDURE — 97162 PT EVAL MOD COMPLEX 30 MIN: CPT | Performed by: PHYSICAL THERAPIST

## 2023-01-12 PROCEDURE — 97140 MANUAL THERAPY 1/> REGIONS: CPT | Performed by: PHYSICAL THERAPIST

## 2023-01-12 NOTE — FLOWSHEET NOTE
[facility], [facility],  [place], [address]   Phone: [phone]  Fax [phone]     Physical Therapy: Daily Note   Patient: Nitza Jackson (35 y.o. male)   Treatment Date: 2023   :  1955 MRN: [id number]   Visit #: 1   Auth needed? []  Yes    []  No   Amount authorized:   Visit Limit:  Insurance: Payor: Margaret Fitzpatrick / Plan: HUMANA GOLD PLUS HMO / Product Type: *No Product type* /   Insurance ID: [id number] - (Medicaid Managed)  Secondary Insurance (if applicable): [place]ICA[place]   Treatment Diagnosis:      ICD-10-CM    1. Acute pain of left shoulder  M25.512          Medical Diagnosis: Pain in left shoulder [M25.512]     Referring Physician: Aide Kumar DO    PCP: Dung Lerner MD   Plan of Care signed? []  Yes [x]  No  Latex Allergy? []  Yes [x]  No   Pace Maker? []  Yes [x]  No    Preferred Language for Healthcare:   [x] English       [] other:       RESTRICTIONS/PRECAUTIONS: none    Functional Outcome Measure/Scale:    Date Assessed (Eval)     QuickDash (%) 36         SUBJECTIVE EXAMINATION   Pain level:  6 /10    Patient Report/Comments:       OBJECTIVE EXAMINATION   Observation:     Test measurements:     TREATMENT     Exercise / Equipment / Intervention Sets / [place] / Resistance Comments / Cueing HEP         SBS 3\" x10     No-money 3\" x 10     SL ER x10     Tableslide flexion 10\" x 5                                                                         Manual Treatment: G1,2 PA mobs, and PROM x 10'      Modalities:    [x] None  [] Electrical Stimulation: for pain modulation and symptom control  [] Other:     Access Code: [email or web address]  URL: [email or web address]  Date: 2023  Prepared by:  Kain Limon Fat    Exercises  Seated Scapular Retraction - 1-3 x daily - 7 x weekly - 10-15 reps - 3\" hold  Shoulder External Rotation and Scapular Retraction - 1-3 x daily - 7 x weekly - 10-15 reps - 3\" hold  Sidelying Shoulder External Rotation - 1-3 x daily - 7 x weekly - 1-3 sets - 10 reps  Seated Shoulder Flexion Towel Slide at Table Top Full Range of Motion - 1-3 x daily - 7 x weekly - 10 reps - 10\" hold    ASSESSMENT     Treatment/Activity Tolerance:  [x] Patient tolerated treatment well [] Patient limited by fatigue  [] Patient limited by pain  [] Patient limited by other medical complications  [] Other:     Overall Progression Towards Functional goals/ Treatment Progress Update:  [] Patient is progressing as expected towards functional goals listed. [] Progression is slowed due to complexities/Impairments listed. [] Progression has been slowed due to co-morbidities. [x] Plan just implemented, too soon (<30days) to assess goals progression   [] Goals require adjustment due to lack of progress  [] Patient is not progressing as expected and requires additional follow up with physician  [] Other:     Prognosis for POC:   [x] Good     [] Fair     [] Poor      Patient requires continued skilled intervention: [x] Yes       [] No    GOALS     Patient stated goal: Use shoulder pain free  [x] Progressing: [] Met: [] Not Met: [] Adjusted     Therapist goals for Patient:   Short Term Goals: To be achieved in: 2 weeks  Independent in HEP and progression per patient tolerance, in order to progress toward full function and prevent re-injury. [x] Progressing: [] Met: [] Not Met: [] Adjusted  Patient will have a decrease in pain to facilitate improvement in movement, function, and ADLs as indicated by functional deficits. [x] Progressing: [] Met: [] Not Met: [] Adjusted     Long Term Goals: To be achieved in: 8 weeks  Disability index score of 25% or less as measure by QuickDash to assist with reaching prior level of function.   [x] Progressing: [] Met: [] Not Met: [] Adjusted  Patient will demonstrate increased AROM to WNL, good Lumbar Spine mobility, good hip ROM to allow for proper joint functioning as indicated by patients Functional Deficits.   [x] Progressing: [] Met: [] Not Met: [] Adjusted  Patient will demonstrate an increase in proximal hip strength and core activation to allow for proper functional mobility as indicated by patients Functional Deficits  [x] Progressing: [] Met: [] Not Met: [] Adjusted  Patient will return to Light home activities without increased symptoms or restriction.   [x] Progressing: [] Met: [] Not Met: [] Adjusted    TREATMENT PLAN     [] Continue per plan of care [] Alter current plan  [x] Plan of care initiated [] Hold pending MD visit  [] Discharge    BILLING     Timed Code Treatment Minutes: 25   Total Treatment Minutes: 60     If Medicare: NA  If C: NA    CPT Code # CPT Code #     [] Eval Low (47500)    [] Gait (88406)     [x] Eval Medium (05570) 1  [] VASO (80162)     [] Eval High (83276)   [] Estim Unattended (91802)     [] Re-Eval (38217)   [] [facility] Traction (58758)     [x] Therex (04737)  1  [] Dry Needle 1-2 muscle (20560)     [] Neuromusc. Re-ed (93767)   [] Dry Needle 3+ muscle (20561)     [x] Manual (96059) 1  [] Group Therapy     [] Ther. Act (16042)   []       Electronically Signed by Kain Garcia PT  Date: 01/12/2023

## 2023-01-12 NOTE — THERAPY EVALUATION
[facility], [address]  Phone: [phone]  Fax [phone]    Dear Yahaira Hernandez DO  ,    We had the pleasure of evaluating the following patient for physical therapy services at [address] [place] [address]. A summary of our findings can be found in the initial assessment below. This includes our plan of care. If you have any questions or concerns regarding these findings, please do not hesitate to contact me at the office phone number listed above. Thank you for the referral.     Physician Signature:_______________________________Date:__________________  By signing above (or electronic signature), therapists plan is approved by physician       Physical Therapy: Initial Evaluation   Patient: Milly Neves (23 y.o. male)   Examination Date: 2023   :  1955 MRN: [id number]   Visit #: 1   Auth needed? []  Yes    []  No   Amount authorized:   Visit Limit:  Insurance: Payor: Radha Chappell / Plan: HUMANA GOLD PLUS HMO / Product Type: *No Product type* /   Insurance ID: [id number] - (Medicaid Managed)  Secondary Insurance (if applicable): [place]ICA[place]   Treatment Diagnosis:      ICD-10-CM    1. Acute pain of left shoulder  M25.512          Medical Diagnosis: Pain in left shoulder [M25.512]     Referring Physician: Yahaira Hernandez DO    PCP: Jhonny Adams MD   Plan of Care signed? []  Yes [x]  No  Latex Allergy? []  Yes [x]  No   Pace Maker? []  Yes [x]  No    Preferred Language for Healthcare:   [x] English       [] other:     C-SSRS Triggered by Intake questionnaire (Past 2 wk assessment):   [] No, Questionnaire did not trigger screening. [x] Yes, Patient intake triggered further evaluation      [x] C-SSRS Screening completed, no further action needed.   [] PCP notified via Plan of Care  [] Emergency services notified     SUBJECTIVE EXAMINATION     Patient stated complaint: L shoulder pain that began after falling during a seizure 1-month ago. Relevant Medical History: R side stroke and seizure disorder. Pain:  Pt. reports 6 /10 at present and 8 /10 at its worst  Pt. describes pain to be Sharp and tingling  Pain increases with: Activity and Movement  Pain decreases with: Resting and Ice  Pt. reports pain with coughing, sneezing and laughing?:  NO  Pt. reports saddle paresthesia? NO    Red Flags:  None    Precautions/Contraindications:    None    Functional Outcome Measure/Scale:    Date Assessed 01/12/2023   Measure Used QuickDash   FOTO Score (%) 36     Living Status/PLOF: Lives alone. Handedness: [x] Right   [] Left    Occupation/School:   Work/School Status: Disabled  Job Duties/Demands: NA    Sport/recreational activities: Boxing    OBJECTIVE EXAMINATION     Palpation:   Patient reported tenderness with palpation    Posture: Forward head and Forward shoulders    Bandages/Dressings/Incisions:  Not Applicable      ROM, Strength and Myotomes:  (Blank cells denote NT)  Range Tested MMT/ Resisted PROM (o) AROM (o) Comments   *denotes pain L R L R L R    Cervical Flexion (C1-2)          Cervical Sidebend (C3)          Shoulder Shrug (C4)          Shoulder Flexion     100 155    Shoulder Extension          Shld. Abduction (C5)     75 134    Shld. Adduction           Shoulder IR          Shoulder ER          Elbow Flexion (C6)          Elbow Extension (C7)          Radial Deviation          Ulnar Deviation          Thumb Extension (C8)          Intrinsics (T1)                       Special Tests  [x] Not Assessed    Specific Joint Mobility Testing/Accessory Motions:   Glenohumeral: hypomobile  Scapulothoracic: NT  Elbow: NT    Gait:  [x] WNL     [] Not Observed     [] Dysfunction noted  Comment:        Balance:  [x] WNL      [] NT       [] Dysfunction noted  Comment:     Falls Risk Assessment (30 days):    Falls Risk assessed and no intervention required. Time Up and Go (TUG): Not Assessed     Review Of Systems (ROS):  [x] Patient history, allergies, meds reviewed. [x] Performed Review of systems (Integumentary, CardioPulmonary, Neurological) by intake and observation. Intake form has been scanned into medical record. Patient has been instructed to contact their primary care physician regarding ROS issues if not already being addressed at this time. Co-morbidities/Complexities (which will affect the course of rehabilitation):  []  None         Arthritic conditions   [] Rheumatoid arthritis (M05.9)  [] Osteoarthritis (M19.91)   Cardiovascular conditions   [x] Hypertension (I10)  [] Hyperlipidemia (E78.5)  [] Angina pectoris (I20)  [] Atherosclerosis (I70)   Musculoskeletal conditions   [] Disc pathology   [] Congenital spine pathologies   [] Prior surgical intervention  [] Osteoporosis (M81.8)  [] Osteopenia (M85.8)   Endocrine conditions   [] Hypothyroid (E03.9)  [] Hyperthyroid Gastrointestinal conditions   [] Constipation (N41.15)   Metabolic conditions   [] Morbid obesity (E66.01)  [] Diabetes type 1 (E10.65)  [] Diabetes type 2 (E11.65)  [] Neuropathy (G60.9)   Pulmonary conditions   [] Asthma (J45)  [] Coughing   [] COPD (J44.9) Psychological Disorders  [x] Anxiety (F41.9)  [x] Depression (F32.9)    [] COVID-19   [x] Other: Stoke     Barriers to/and or personal factors that will affect rehab potential:   Cognitive function, education/learning barriers  Transportation    ASSESSMENT   Assessment:  Pt. is a 79 y.o. y.o. male presenting today to Outpatient PT with Pain in left shoulder [M25.512]  . Pt. presents with the functional impairments and activity limitations listed below and would benefit from continued Outpatient PT to address the below impairments as well as improve pain, and restore function.      Functional Impairments:    Noted spinal or UE joint hypomobility  Decreased UE functional ROM  Decreased UE functional strength     Functional Activity Limitations (from functional questionnaire and intake):  Reduced ability to sleep  Reduced ability to perform lifting, reaching, carrying tasks  Reduced ability to reach behind back    Participation Restrictions:   Reduced participation in self care  Reduced participation in social activities  Reduced participation in sports/recreation    Classification :   Signs/symptoms consistent with joint sprain/strain    Prognosis/Rehab Potential:  [x] Excellent     [] Good     [] Fair     [] Poor         Tolerance of evaluation/treatment:   [x] Excellent     [] Good     [] Fair     [] Poor    Physical Therapy Evaluation Complexity Justification:  [x] A history of present problem and 3 or more personal factors and/or co-morbidities that impact the plan of care  [x] A total of 3 elements found upon examination of body systems using standardized tests and measures addressing any of the following: body structures, functions (impairments), activity limitations, and/or participation restrictions  [x] A clinical presentation with stable and/or uncomplicated characteristics   [x] Clinical decision making of MODERATE (96903) complexity using standardized patient assessment instrument and/or measurable assessment of functional outcome. GOALS     Patient stated goal: Use shoulder pain free  [x] Progressing: [] Met: [] Not Met: [] Adjusted    Therapist goals for Patient:   Short Term Goals: To be achieved in: 2 weeks  Independent in HEP and progression per patient tolerance, in order to progress toward full function and prevent re-injury. [x] Progressing: [] Met: [] Not Met: [] Adjusted  Patient will have a decrease in pain to facilitate improvement in movement, function, and ADLs as indicated by functional deficits. [x] Progressing: [] Met: [] Not Met: [] Adjusted    Long Term Goals:  To be achieved in: 8 weeks  Disability index score of 25% or less as measure by QuickDash to assist with reaching prior level of function. [x] Progressing: [] Met: [] Not Met: [] Adjusted  Patient will demonstrate increased AROM to WNL, good Lumbar Spine mobility, good hip ROM to allow for proper joint functioning as indicated by patients Functional Deficits. [x] Progressing: [] Met: [] Not Met: [] Adjusted  Patient will demonstrate an increase in proximal hip strength and core activation to allow for proper functional mobility as indicated by patients Functional Deficits  [x] Progressing: [] Met: [] Not Met: [] Adjusted  Patient will return to Light home activities without increased symptoms or restriction. [x] Progressing: [] Met: [] Not Met: [] Adjusted    TREATMENT PLAN     Frequency/Duration: 2x/week for 8 weeks for the treatment interventions listed below. Interventions:  [x]  Therapeutic exercise including: strength training and ROM, for the upper extremities and core   [x]  NMR activation and proprioception for UE, periscapular, RC muscles and core, including postural re-education. [x]  Manual therapy as indicated for Shoulder, Scapula, and Spine to include: PROM, Gr I-IV mobilizations, and STM   [x]  Modalities as needed that may include: Electrical Stimulation and Thermal Agents  [x]  Patient education on joint protection, postural re-education, activity modification, progression of HEP. HEP: Patient HEP program created electronically. Refer to [address] [place] [address] access code: Access Code:  [email or web address]    Electronically Signed by Pratik Anguiano PT  Date: 01/12/2023

## 2023-01-17 ENCOUNTER — HOSPITAL ENCOUNTER (OUTPATIENT)
Dept: PHYSICAL THERAPY | Age: 68
Setting detail: THERAPIES SERIES
Discharge: HOME | End: 2023-01-17
Payer: MEDICARE

## 2023-01-17 NOTE — PROGRESS NOTES
[facility], [facility],  Lois W[place]an  [place], [address]  Phone: [phone]  Fax [phone]     Physical Therapy  Cancellation/No-show Note  Patient Name:  Geronimo Horner  :  1955   Date:  2023  Cancelled visits to date: 0  No-shows to date: 1    For today's appointment patient:  []  Cancelled  []  Rescheduled appointment  [x]  No-show     Reason given by patient:  []  Patient ill  []  Conflicting appointment  []  No transportation    []  Conflict with work  []  No reason given  []  Other:     Comments:     Electronically signed by:   Estella Martinez PT

## 2023-01-19 ENCOUNTER — HOSPITAL ENCOUNTER (OUTPATIENT)
Dept: PHYSICAL THERAPY | Age: 68
Setting detail: THERAPIES SERIES
Discharge: HOME | End: 2023-01-19
Payer: MEDICARE

## 2023-01-19 PROCEDURE — 97110 THERAPEUTIC EXERCISES: CPT

## 2023-01-19 PROCEDURE — 97140 MANUAL THERAPY 1/> REGIONS: CPT

## 2023-01-19 NOTE — FLOWSHEET NOTE
[facility], [facility], [address], [address]  Phone: [phone]  Fax [phone]     Physical Therapy: Daily Note   Patient: Mellisa Coburn (1 y.o. male)   Treatment Date: 2023   :  1955 MRN: [id number]   Visit #: 2   Auth needed? [x]  Yes    []  No   Amount authorized: 16  Visit Limit: 13-3/3/23 Insurance: Payor: Rajendra Le / Plan: Lang Nayeli PLUS HMO / Product Type: *No Product type* /   Insurance ID: [id number] - (Medicaid Managed)  Secondary Insurance (if applicable): [employer] [place]   Treatment Diagnosis:      ICD-10-CM    1. Acute pain of left shoulder  M25.512          Medical Diagnosis: Pain in left shoulder [M25.512]     Referring Physician: Omer Joseph DO    PCP: Jong John MD   Plan of Care signed? []  Yes [x]  No  Latex Allergy? []  Yes [x]  No   Pace Maker? []  Yes [x]  No    Preferred Language for Healthcare:   [x] English       [] other:       RESTRICTIONS/PRECAUTIONS: none    Functional Outcome Measure/Scale:    Date Assessed (Eval)     QuickDash (%) 36         SUBJECTIVE EXAMINATION   Pain level:  6 /10    Patient Report/Comments: Pt reports that he has tried the exercises plus some other ones and his shoulder is more sore after the swinging exercises that he thought to do on his own. \"It's popping in and out of place. \"       OBJECTIVE EXAMINATION   Observation:     Test measurements:     TREATMENT     Exercise / Equipment / Intervention Sets / Cheron Bump / Resistance Comments / Cueing HEP         SBS 3\" x10  Y   No-money 3\" x 10  Y   SL ER x10  Y   Tableslide flexion 10\" x 5  Y         Supine shld flex holding ball 10x     Supine shld push up holding ball 10x     Supine shld horiz ABD/ADD holding ball 10x     Supine shld stabilization with perturbations 2x30\"      Side-lying scapular neuro re-ed X5 mins     Side-lying serratus push out 10x  With shld flex 10x Small ROM into flexion    Side-lying shld horiz ABD 10x L  Y                       Manual Treatment: G1,2 PA and inferior mobs, and PROM x 10'      Modalities:    [x] None  [] Electrical Stimulation: for pain modulation and symptom control  [] Other:     Access Code: [email or web address]  URL: [email or web address]  Date: 01/12/2023  Prepared by: Kain Guido    Exercises  Seated Scapular Retraction - 1-3 x daily - 7 x weekly - 10-15 reps - 3\" hold  Shoulder External Rotation and Scapular Retraction - 1-3 x daily - 7 x weekly - 10-15 reps - 3\" hold  Sidelying Shoulder External Rotation - 1-3 x daily - 7 x weekly - 1-3 sets - 10 reps  Seated Shoulder Flexion Towel Slide at Table Top Full Range of Motion - 1-3 x daily - 7 x weekly - 10 reps - 10\" hold    ASSESSMENT     Treatment/Activity Tolerance:  [x] Patient tolerated treatment well [] Patient limited by fatigue  [] Patient limited by pain  [] Patient limited by other medical complications  [] Other:     Overall Progression Towards Functional goals/ Treatment Progress Update:  [] Patient is progressing as expected towards functional goals listed. [] Progression is slowed due to complexities/Impairments listed. [] Progression has been slowed due to co-morbidities. [x] Plan just implemented, too soon (<30days) to assess goals progression   [] Goals require adjustment due to lack of progress  [] Patient is not progressing as expected and requires additional follow up with physician  [] Other:     Prognosis for POC:   [x] Good     [] Fair     [] Poor      Patient requires continued skilled intervention: [x] Yes       [] No    GOALS     Patient stated goal: Use shoulder pain free  [x] Progressing: [] Met: [] Not Met: [] Adjusted     Therapist goals for Patient:   Short Term Goals: To be achieved in: 2 weeks  Independent in HEP and progression per patient tolerance, in order to progress toward full function and prevent re-injury.    [x] Progressing: [] Met: [] Not Met: [] Adjusted  Patient will have a decrease in pain to facilitate improvement in movement, function, and ADLs as indicated by functional deficits.              [x] Progressing: [] Met: [] Not Met: [] Adjusted     Long Term Goals: To be achieved in: 8 weeks  Disability index score of 25% or less as measure by QuickDash to assist with reaching prior level of function.  [x] Progressing: [] Met: [] Not Met: [] Adjusted  Patient will demonstrate increased AROM to WNL, good Lumbar Spine mobility, good hip ROM to allow for proper joint functioning as indicated by patients Functional Deficits.   [x] Progressing: [] Met: [] Not Met: [] Adjusted  Patient will demonstrate an increase in proximal hip strength and core activation to allow for proper functional mobility as indicated by patients Functional Deficits  [x] Progressing: [] Met: [] Not Met: [] Adjusted  Patient will return to Light home activities without increased symptoms or restriction.   [x] Progressing: [] Met: [] Not Met: [] Adjusted    TREATMENT PLAN     [x] Continue per plan of care [] Alter current plan  [] Plan of care initiated [] Hold pending MD visit  [] Discharge    BILLING     Timed Code Treatment Minutes: 45   Total Treatment Minutes: 45     If Medicare: NA  If [facility]: NA    CPT Code # CPT Code #     [] Eval Low (51518)    [] Gait (36000)     [] Eval Medium (50471)   [] VASO (05498)     [] Eval High (47422)   [] Estim Unattended (46943)     [] Re-Eval (97817)   [] [facility]. Traction (35195)     [x] Therex (23067)  2  [] Dry Needle 1-2 muscle (20560)     [] Neuromusc. Re-ed (78961)   [] Dry Needle 3+ muscle (20561)     [x] Manual (59431) 1  [] Group Therapy     [] Ther. Act (69907)   []       Electronically Signed by Mary Kate Henao PT  Date: 01/19/2023

## 2023-01-24 ENCOUNTER — APPOINTMENT (OUTPATIENT)
Dept: PHYSICAL THERAPY | Age: 68
End: 2023-01-24
Payer: MEDICARE

## 2023-01-26 ENCOUNTER — HOSPITAL ENCOUNTER (OUTPATIENT)
Dept: PHYSICAL THERAPY | Age: 68
Setting detail: THERAPIES SERIES
Discharge: HOME | End: 2023-01-26
Payer: MEDICARE

## 2023-01-26 NOTE — PROGRESS NOTES
[facility]  [facility], [facility],  [address] [place]  [place], [address]  Phone: [phone]  Fax [phone]     Physical Therapy  Cancellation/No-show Note  Patient Name:  Rizwan Garcia  :  1955   Date:  2023  Cancelled visits to date: 0  No-shows to date: 2    For today's appointment patient:  []  Cancelled  []  Rescheduled appointment  [x]  No-show     Reason given by patient:  []  Patient ill  []  Conflicting appointment  []  No transportation    []  Conflict with work  []  No reason given  []  Other:     Comments:     Electronically signed by:  Pia Mansfield PT

## 2023-01-31 ENCOUNTER — HOSPITAL ENCOUNTER (OUTPATIENT)
Dept: PHYSICAL THERAPY | Age: 68
Setting detail: THERAPIES SERIES
Discharge: HOME | End: 2023-01-31
Payer: MEDICARE

## 2023-01-31 NOTE — PROGRESS NOTES
[facility]  [facility], [facility],  [address]  [place], [address]  Phone: [phone]  Fax [phone]     Physical Therapy  Cancellation/No-show Note  Patient Name:  Clair Gupta  :  1955   Date:  2023  Cancelled visits to date: 0  No-shows to date: 3    For today's appointment patient:  []  Cancelled  []  Rescheduled appointment  [x]  No-show     Reason given by patient:  []  Patient ill  []  Conflicting appointment  []  No transportation    []  Conflict with work  []  No reason given  []  Other:     Comments:     Electronically signed by:   Carlin Dixon, PT

## 2023-02-02 ENCOUNTER — HOSPITAL ENCOUNTER (OUTPATIENT)
Dept: PHYSICAL THERAPY | Age: 68
Setting detail: THERAPIES SERIES
Discharge: HOME | End: 2023-02-02

## 2023-02-02 NOTE — PROGRESS NOTES
[facility], [facility],  [address] [place], [address]  Phone: [phone]  Fax [phone]     Physical Therapy  Cancellation/No-show Note  Patient Name:  Nica Lopez  :  1955   Date:  2023  Cancelled visits to date: 0  No-shows to date: 3    For today's appointment patient:  []  Cancelled  []  Rescheduled appointment  [x]  No-show     Reason given by patient:  []  Patient ill  []  Conflicting appointment  []  No transportation    []  Conflict with work  []  No reason given  []  Other:     Comments:     Electronically signed by:   Helena Booth PT

## 2023-02-07 ENCOUNTER — HOSPITAL ENCOUNTER (OUTPATIENT)
Dept: PHYSICAL THERAPY | Age: 68
Setting detail: THERAPIES SERIES
Discharge: HOME | End: 2023-02-07

## 2023-02-07 NOTE — PROGRESS NOTES
[facility], [facility],  [address], [address]  Phone: [phone]  Fax [phone]     Physical Therapy  Cancellation/No-show Note  Patient Name:  Eloy Bell  :  1955   Date:  2023  Cancelled visits to date: 0  No-shows to date: 11    For today's appointment patient:  []  Cancelled  []  Rescheduled appointment  [x]  No-show     Reason given by patient:  []  Patient ill  []  Conflicting appointment  []  No transportation    []  Conflict with work  []  No reason given  []  Other:     Comments:     Electronically signed by:   Tate Martinez PT

## 2023-04-19 ENCOUNTER — HOSPITAL ENCOUNTER (OUTPATIENT)
Dept: GENERAL RADIOLOGY | Age: 68
Discharge: HOME | End: 2023-04-19
Payer: MEDICARE

## 2023-04-19 ENCOUNTER — HOSPITAL ENCOUNTER (OUTPATIENT)
Age: 68
Discharge: HOME | End: 2023-04-19
Payer: MEDICARE

## 2023-04-19 DIAGNOSIS — G89.29 CHRONIC LEFT SHOULDER PAIN: ICD-10-CM

## 2023-04-19 DIAGNOSIS — M25.512 CHRONIC LEFT SHOULDER PAIN: ICD-10-CM

## 2023-04-19 PROCEDURE — 73030 X-RAY EXAM OF SHOULDER: CPT

## 2024-03-30 NOTE — TELEPHONE ENCOUNTER
Went to the ED last night and was put on Prednisone.  He is needing Albuterol sent into [facility] pharmacy
No

## (undated) DEVICE — Device

## (undated) DEVICE — ZIMMER® STERILE DISPOSABLE TOURNIQUET CUFF WITH PLC, DUAL PORT, SINGLE BLADDER, 18 IN. (46 CM)

## (undated) DEVICE — SUTURE ETHLN SZ 4-0 L18IN NONABSORBABLE BLK L19MM PS-2 3/8 1667H

## (undated) DEVICE — GLOVE SURG SZ 65 THK91MIL LTX FREE SYN POLYISOPRENE

## (undated) DEVICE — SET GRAV VENT NVENT CK VLV 3 NDL FREE PRT 10 GTT

## (undated) DEVICE — SUTURE VCRL + SZ 3-0 L27IN ABSRB UD L26MM SH 1/2 CIR VCP416H

## (undated) DEVICE — STANDARD HYPODERMIC NEEDLE,POLYPROPYLENE HUB: Brand: MONOJECT

## (undated) DEVICE — CORD ES L12FT BPLR FRCP

## (undated) DEVICE — DRAPE EQUIP C ARM MINI 10000100] TIDI PRODUCTS INC]

## (undated) DEVICE — SOLUTION IV IRRIG 500ML 0.9% SODIUM CHL 2F7123

## (undated) DEVICE — 3M™ STERI-STRIP™ REINFORCED ADHESIVE SKIN CLOSURES, R1547, 1/2 IN X 4 IN (12 MM X 100 MM), 6 STRIPS/ENVELOPE: Brand: 3M™ STERI-STRIP™

## (undated) DEVICE — SET ADMIN PRIMING 7ML L30IN 7.35LB 20 GTT 2ND RLER CLMP

## (undated) DEVICE — Z CONVERTED USE 2276073 ROLL BNDG L W4.5INXL4 1/8YD WHT COT 6 PLY CNFRM KERLIX

## (undated) DEVICE — GLOVE SURG SZ 75 L12IN FNGR THK94MIL STD WHT LTX FREE

## (undated) DEVICE — COMFO-TEX ELASTIC BANDAGE LATEX FREE, 2INX5YD: Brand: COMFO-TEX™

## (undated) DEVICE — 3M™ COBAN™ NL STERILE NON-LATEX SELF-ADHERENT WRAP, 2083S, 3 IN X 5 YD (7,5 CM X 4,5 M), 24 ROLLS/CASE: Brand: 3M™ COBAN™

## (undated) DEVICE — CONVERTED USE 304929 SPONGE GAUZE CURITY 4X4IN 16-PLY ST

## (undated) DEVICE — SYRINGE MED 10ML LUERLOCK TIP W/O SFTY DISP

## (undated) DEVICE — GLOVE SURG SZ 6 THK91MIL LTX FREE SYN POLYISOPRENE ANTI

## (undated) DEVICE — PADDING CAST W4INXL4YD NONSTERILE COT RAYON MICROPLEATED

## (undated) DEVICE — AIRLIFE™ NASAL OXYGEN CANNULA CURVED, FLARED TIP, WITH 7 FEET (2.1 M) CRUSH RESISTANT TUBING, OVER-THE-EAR STYLE: Brand: AIRLIFE™

## (undated) DEVICE — SUTURE VCRL SZ 4-0 L18IN ABSRB UD L19MM PS-2 3/8 CIR PRIM J496H

## (undated) DEVICE — GUIDEWIRE ORTH 1.4 MM DISP

## (undated) DEVICE — KIT INF CTRL 2OZ LUB TBNG L12FT DBL END BRSH SYR OP4

## (undated) DEVICE — CATHETER IV 20GA L1.25IN PNK FEP SFTY STR HUB RADPQ DISP

## (undated) DEVICE — GOWN,SIRUS,NON REINFRCD,LARGE,SET IN SL: Brand: MEDLINE

## (undated) DEVICE — PACK EXTREMITY XR

## (undated) DEVICE — Z DISCONTINUED USE 2276105 GOWN PROTCT UNIV CHST W28IN L49IN SL 24IN BLU SPUNBOND FLM

## (undated) DEVICE — NEEDLE HYPO 25GA L1.5IN BLU POLYPR HUB S STL REG BVL STR

## (undated) DEVICE — MICRO SAGITTAL BLADE, COARSE, 9.5 X 25.5 X 0.4 MM: Brand: CONMED

## (undated) DEVICE — GLOVE ORANGE PI 7   MSG9070

## (undated) DEVICE — 3M™ COBAN™ NL STERILE NON-LATEX SELF-ADHERENT WRAP, 2084S, 4 IN X 5 YD (10 CM X 4,5 M), 18 ROLLS/CASE: Brand: 3M™ COBAN™

## (undated) DEVICE — MEDC BAG ICE SM REFILLABLE W/TIES

## (undated) DEVICE — GLOVE SURG SZ 8 L12IN FNGR THK94MIL STD WHT LTX FREE

## (undated) DEVICE — 3M™ TEGADERM™ TRANSPARENT FILM DRESSING FRAME STYLE, 1624W, 2-3/8 IN X 2-3/4 IN (6 CM X 7 CM), 100/CT 4CT/CASE: Brand: 3M™ TEGADERM™

## (undated) DEVICE — SOLUTION IV 1000ML LAC RINGERS PH 6.5 INJ USP VIAFLX PLAS

## (undated) DEVICE — BLADE OPHTH 180DEG CUT SURF BLU STR SHRP DBL BVL GRINDLESS

## (undated) DEVICE — SUTURE MCRYL SZ 4-0 L18IN ABSRB UD L19MM PS-2 3/8 CIR PRIM Y496G

## (undated) DEVICE — GLOVE ORANGE PI 8   MSG9080

## (undated) DEVICE — ABDOMINAL PAD: Brand: DERMACEA

## (undated) DEVICE — SPLINT ORTH W4XL30IN LAYERED FBRGLS FOAM PD BRTH BK MOLD

## (undated) DEVICE — CHLORAPREP 26ML ORANGE